# Patient Record
Sex: MALE | Race: BLACK OR AFRICAN AMERICAN | Employment: FULL TIME | URBAN - METROPOLITAN AREA
[De-identification: names, ages, dates, MRNs, and addresses within clinical notes are randomized per-mention and may not be internally consistent; named-entity substitution may affect disease eponyms.]

---

## 2019-04-24 ENCOUNTER — OFFICE VISIT (OUTPATIENT)
Dept: FAMILY MEDICINE CLINIC | Age: 29
End: 2019-04-24
Payer: MEDICARE

## 2019-04-24 VITALS
HEART RATE: 68 BPM | DIASTOLIC BLOOD PRESSURE: 72 MMHG | RESPIRATION RATE: 14 BRPM | SYSTOLIC BLOOD PRESSURE: 116 MMHG | BODY MASS INDEX: 49.44 KG/M2 | TEMPERATURE: 96.8 F | OXYGEN SATURATION: 95 % | WEIGHT: 315 LBS | HEIGHT: 67 IN

## 2019-04-24 DIAGNOSIS — Z83.3 FAMILY HISTORY OF DIABETES MELLITUS: ICD-10-CM

## 2019-04-24 DIAGNOSIS — Z86.39 PERSONAL HISTORY OF OTHER ENDOCRINE, NUTRITIONAL AND METABOLIC DISEASE: ICD-10-CM

## 2019-04-24 DIAGNOSIS — Z00.00 ANNUAL PHYSICAL EXAM: ICD-10-CM

## 2019-04-24 DIAGNOSIS — Z00.00 ANNUAL PHYSICAL EXAM: Primary | ICD-10-CM

## 2019-04-24 LAB
ALBUMIN SERPL-MCNC: 4.3 G/DL (ref 3.5–4.6)
ALP BLD-CCNC: 81 U/L (ref 35–104)
ALT SERPL-CCNC: 32 U/L (ref 0–41)
ANION GAP SERPL CALCULATED.3IONS-SCNC: 17 MEQ/L (ref 9–15)
AST SERPL-CCNC: 20 U/L (ref 0–40)
BASOPHILS ABSOLUTE: 0 K/UL (ref 0–0.2)
BASOPHILS RELATIVE PERCENT: 0.7 %
BILIRUB SERPL-MCNC: 0.3 MG/DL (ref 0.2–0.7)
BUN BLDV-MCNC: 14 MG/DL (ref 6–20)
CALCIUM SERPL-MCNC: 9.5 MG/DL (ref 8.5–9.9)
CHLORIDE BLD-SCNC: 97 MEQ/L (ref 95–107)
CHOLESTEROL, FASTING: 142 MG/DL (ref 0–199)
CO2: 25 MEQ/L (ref 20–31)
CREAT SERPL-MCNC: 0.91 MG/DL (ref 0.7–1.2)
EOSINOPHILS ABSOLUTE: 0.1 K/UL (ref 0–0.7)
EOSINOPHILS RELATIVE PERCENT: 1.9 %
GFR AFRICAN AMERICAN: >60
GFR NON-AFRICAN AMERICAN: >60
GLOBULIN: 3.8 G/DL (ref 2.3–3.5)
GLUCOSE BLD-MCNC: 75 MG/DL (ref 70–99)
HBA1C MFR BLD: 5.7 % (ref 4.8–5.9)
HCT VFR BLD CALC: 41.9 % (ref 42–52)
HDLC SERPL-MCNC: 42 MG/DL (ref 40–59)
HEMOGLOBIN: 13.5 G/DL (ref 14–18)
LDL CHOLESTEROL CALCULATED: 88 MG/DL (ref 0–129)
LYMPHOCYTES ABSOLUTE: 2.2 K/UL (ref 1–4.8)
LYMPHOCYTES RELATIVE PERCENT: 36.1 %
MCH RBC QN AUTO: 26.1 PG (ref 27–31.3)
MCHC RBC AUTO-ENTMCNC: 32.1 % (ref 33–37)
MCV RBC AUTO: 81.3 FL (ref 80–100)
MONOCYTES ABSOLUTE: 0.4 K/UL (ref 0.2–0.8)
MONOCYTES RELATIVE PERCENT: 7 %
NEUTROPHILS ABSOLUTE: 3.3 K/UL (ref 1.4–6.5)
NEUTROPHILS RELATIVE PERCENT: 54.3 %
PDW BLD-RTO: 15.3 % (ref 11.5–14.5)
PLATELET # BLD: 340 K/UL (ref 130–400)
POTASSIUM SERPL-SCNC: 4.6 MEQ/L (ref 3.4–4.9)
RBC # BLD: 5.15 M/UL (ref 4.7–6.1)
SODIUM BLD-SCNC: 139 MEQ/L (ref 135–144)
TOTAL PROTEIN: 8.1 G/DL (ref 6.3–8)
TRIGLYCERIDE, FASTING: 60 MG/DL (ref 0–150)
WBC # BLD: 6 K/UL (ref 4.8–10.8)

## 2019-04-24 PROCEDURE — G0439 PPPS, SUBSEQ VISIT: HCPCS | Performed by: NURSE PRACTITIONER

## 2019-04-24 ASSESSMENT — ENCOUNTER SYMPTOMS
CONSTIPATION: 0
EYE ITCHING: 0
SHORTNESS OF BREATH: 1
TROUBLE SWALLOWING: 0
NAUSEA: 0
CHEST TIGHTNESS: 0
EYE PAIN: 0
BACK PAIN: 0
FACIAL SWELLING: 0
COUGH: 0
VOMITING: 0
ABDOMINAL PAIN: 0
EYE DISCHARGE: 0
DIARRHEA: 0
EYE REDNESS: 0

## 2019-04-24 ASSESSMENT — PATIENT HEALTH QUESTIONNAIRE - PHQ9
SUM OF ALL RESPONSES TO PHQ QUESTIONS 1-9: 0
SUM OF ALL RESPONSES TO PHQ QUESTIONS 1-9: 0
2. FEELING DOWN, DEPRESSED OR HOPELESS: 0
SUM OF ALL RESPONSES TO PHQ9 QUESTIONS 1 & 2: 0
1. LITTLE INTEREST OR PLEASURE IN DOING THINGS: 0

## 2019-04-24 NOTE — PROGRESS NOTES
Subjective  Luis Chacon, 26 y.o. male presents today with:  Chief Complaint   Patient presents with   [address]     check up, weigh concerns       Annual exam:  Patient is here for routine yearly physical/preventative visit,and to establish primary care services at this office. Patient has no complaints or concerns today, other than his weight. Has autism spectrum disorder, and is employed at the [employer] in [place]. He has been there since 2009, and reports he enjoys his work. Mother states the program is encouriging him to seek outside employment, but she feels his size and endurance are physically holding him back. Current medications, vital signs, history, and problem list reviewed. Patient does not smoke. Patient does not drink alcohol. Patient  does not use drugs. Overall feels he is doing well. Does report some SOB with exertion, unchanged from recent baseline. States his weight at the beginning of February was 471. He has been trying to cut back on sweets and increase fruit intake. Past Medical History:   Diagnosis Date    Autism spectrum disorder     Hypogonadism male     Obesity        No Known Allergies    No current outpatient medications on file prior to visit. No current facility-administered medications on file prior to visit. Review of Systems   Constitutional: Negative for activity change, appetite change, chills, diaphoresis, fatigue, fever and unexpected weight change. HENT: Negative for congestion, facial swelling, mouth sores and trouble swallowing. Eyes: Negative for pain, discharge, redness, itching and visual disturbance. Respiratory: Positive for shortness of breath (with exertion). Negative for cough and chest tightness. Cardiovascular: Negative for chest pain, palpitations and leg swelling. Gastrointestinal: Negative for abdominal pain, constipation, diarrhea, nausea and vomiting.    Endocrine: Negative for polydipsia, polyphagia and polyuria. Genitourinary: Negative for difficulty urinating. Musculoskeletal: Negative for arthralgias, back pain and myalgias. Skin: Negative for pallor, rash and wound. Neurological: Negative for dizziness, tremors, facial asymmetry and headaches. Objective    Vitals:    04/24/19 1040   BP: 116/72   Site: Left Lower Arm   Position: Sitting   Cuff Size: Medium Adult   Pulse: 68   Resp: 14   Temp: 96.8 °F (36 °C)   TempSrc: Tympanic   SpO2: 95%   Weight: (!) 454 lb 6.4 oz (206.1 kg)   Height: 5' 7\" (1.702 m)       Physical Exam   Constitutional: He is oriented to person, place, and time. Vital signs are normal. He appears well-developed and well-nourished. He is active and cooperative. No distress. HENT:   Head: Normocephalic and atraumatic. Right Ear: Hearing, tympanic membrane, external ear and ear canal normal.   Left Ear: Hearing, tympanic membrane, external ear and ear canal normal.   Nose: Nose normal.   Mouth/Throat: Uvula is midline, oropharynx is clear and moist and mucous membranes are normal. No tonsillar exudate. Eyes: Pupils are equal, round, and reactive to light. Conjunctivae, EOM and lids are normal.   Neck: Trachea normal, normal range of motion and full passive range of motion without pain. Neck supple. Carotid bruit is not present. Cardiovascular: Normal rate, regular rhythm, S1 normal, S2 normal, normal heart sounds and normal pulses. No murmur heard. Pulmonary/Chest: Effort normal and breath sounds normal. No respiratory distress. Abdominal: Soft. Normal appearance and bowel sounds are normal. There is no hepatosplenomegaly. There is no tenderness. No hernia. Musculoskeletal: Normal range of motion. Lymphadenopathy:     He has no cervical adenopathy. Neurological: He is alert and oriented to person, place, and time. He has normal strength and normal reflexes. He displays no atrophy and no tremor.  No cranial nerve deficit or sensory deficit. He exhibits normal muscle tone. He displays no seizure activity. Coordination and gait normal.   Skin: Skin is warm, dry and intact. Capillary refill takes less than 2 seconds. No petechiae and no rash noted. He is not diaphoretic. Psychiatric: He has a normal mood and affect. His speech is normal and behavior is normal. Thought content normal.   Nursing note and vitals reviewed. POC Testing Today: No results found for this visit on 19. Assessment & Plan    Diagnosis Orders   1. Annual physical exam  Hemoglobin A1c    Lipid, Fasting    CBC With Auto Differential    Comprehensive Metabolic Panel   2. BMI 70 and over, adult [facility])  Referral to Bariatrics ([facility]) - Yovani Randolph MD *Must also have referral to Reji Fuentes MD    Referral to Bariatrics ([facility]) - Reji Fuentes MD *Must also have referral to Yovani Randolph MD    Hemoglobin A1c   3. Family history of diabetes mellitus  Hemoglobin A1c   4. Personal history of other endocrine, nutritional and metabolic disease   Hemoglobin A1c     Mother reports she would like Rosanne Richter to find out if he is a candidate for a gastric sleeve procedure to help him manage his weight. States she does not know much about his father's family history, but states \"they are all built just like him. \"  Mother has had gastric bypass surgery as well. Rosanne Richter agrees to referral.      Return for follow up with referred provider. Controlled Substances Monitoring:   No flowsheet data found. Side effects and adverse effects of any medication prescribed today, as well as treatment plan/rationale, follow-up care, and result expectations have been discussed with the patient. Expresses understanding and desires to proceed with treatment plan. Discussed signs and symptoms which require immediate follow-up in ED/call to 911. Understanding verbalized. I have reviewed and updated the electronic medical record.     GEN Gaston NP

## 2019-09-17 ENCOUNTER — HOSPITAL ENCOUNTER (OUTPATIENT)
Dept: GENERAL RADIOLOGY | Age: 29
Discharge: HOME | End: 2019-09-19
Payer: MEDICARE

## 2019-09-17 ENCOUNTER — OFFICE VISIT (OUTPATIENT)
Dept: FAMILY MEDICINE CLINIC | Age: 29
End: 2019-09-17
Payer: MEDICARE

## 2019-09-17 VITALS
WEIGHT: 315 LBS | BODY MASS INDEX: 49.44 KG/M2 | HEART RATE: 88 BPM | OXYGEN SATURATION: 99 % | RESPIRATION RATE: 16 BRPM | DIASTOLIC BLOOD PRESSURE: 76 MMHG | HEIGHT: 67 IN | TEMPERATURE: 97.6 F | SYSTOLIC BLOOD PRESSURE: 108 MMHG

## 2019-09-17 DIAGNOSIS — G47.33 OBSTRUCTIVE SLEEP APNEA SYNDROME: ICD-10-CM

## 2019-09-17 DIAGNOSIS — E66.01 OBESITIES, MORBID (HCC): Primary | ICD-10-CM

## 2019-09-17 DIAGNOSIS — M25.562 ACUTE PAIN OF LEFT KNEE: ICD-10-CM

## 2019-09-17 DIAGNOSIS — E29.1 HYPOGONADISM MALE: ICD-10-CM

## 2019-09-17 DIAGNOSIS — E67.8 OTHER SPECIFIED HYPERALIMENTATION: ICD-10-CM

## 2019-09-17 DIAGNOSIS — I89.0 LYMPHEDEMA: ICD-10-CM

## 2019-09-17 DIAGNOSIS — E66.01 OBESITIES, MORBID (HCC): ICD-10-CM

## 2019-09-17 DIAGNOSIS — E88.81 INSULIN RESISTANCE: ICD-10-CM

## 2019-09-17 LAB
FOLATE: 7.1 NG/ML (ref 7.3–26.1)
VITAMIN B-12: 1189 PG/ML (ref 232–1245)
VITAMIN D 25-HYDROXY: 22.8 NG/ML (ref 30–100)

## 2019-09-17 PROCEDURE — G8417 CALC BMI ABV UP PARAM F/U: HCPCS | Performed by: PHYSICIAN ASSISTANT

## 2019-09-17 PROCEDURE — 1036F TOBACCO NON-USER: CPT | Performed by: PHYSICIAN ASSISTANT

## 2019-09-17 PROCEDURE — 99214 OFFICE O/P EST MOD 30 MIN: CPT | Performed by: PHYSICIAN ASSISTANT

## 2019-09-17 PROCEDURE — G8427 DOCREV CUR MEDS BY ELIG CLIN: HCPCS | Performed by: PHYSICIAN ASSISTANT

## 2019-09-17 PROCEDURE — 73564 X-RAY EXAM KNEE 4 OR MORE: CPT

## 2019-09-18 PROBLEM — I89.0 LYMPHEDEMA: Status: ACTIVE | Noted: 2019-09-18

## 2019-09-18 PROBLEM — M25.562 ACUTE PAIN OF LEFT KNEE: Status: ACTIVE | Noted: 2019-09-18

## 2019-09-18 ASSESSMENT — ENCOUNTER SYMPTOMS
CONSTIPATION: 0
ABDOMINAL PAIN: 0
NAUSEA: 0
VOMITING: 0
EYE REDNESS: 0
COUGH: 0
CHEST TIGHTNESS: 0
EYE PAIN: 0
BACK PAIN: 0
EYE DISCHARGE: 0
SHORTNESS OF BREATH: 1
TROUBLE SWALLOWING: 0
EYE ITCHING: 0
DIARRHEA: 0
FACIAL SWELLING: 0

## 2019-09-19 LAB
SEX HORMONE BINDING GLOBULIN: 20 NMOL/L (ref 11–80)
TESTOSTERONE FREE PERCENT: 2.2 % (ref 1.6–2.9)
TESTOSTERONE FREE, CALC: 37 PG/ML (ref 47–244)
TESTOSTERONE TOTAL-MALE: 167 NG/DL (ref 300–1080)

## 2019-09-20 DIAGNOSIS — E55.9 VITAMIN D DEFICIENCY: ICD-10-CM

## 2019-09-20 DIAGNOSIS — E53.8 FOLATE DEFICIENCY: ICD-10-CM

## 2019-09-20 DIAGNOSIS — E66.01 OBESITIES, MORBID (HCC): Primary | ICD-10-CM

## 2019-09-20 DIAGNOSIS — E29.1 HYPOGONADISM MALE: ICD-10-CM

## 2019-09-20 RX ORDER — CYANOCOBALAMIN/FOLIC AC/VIT B6 0.2-.5-5MG
TABLET ORAL
Qty: 90 TABLET | Refills: 4 | Status: SHIPPED | OUTPATIENT
Start: 2019-09-20 | End: 2020-10-09

## 2019-09-20 RX ORDER — ERGOCALCIFEROL 1.25 MG/1
50000 CAPSULE ORAL WEEKLY
Qty: 12 CAPSULE | Refills: 1 | Status: SHIPPED | OUTPATIENT
Start: 2019-09-20 | End: 2020-10-09

## 2019-09-27 ENCOUNTER — TELEPHONE (OUTPATIENT)
Dept: FAMILY MEDICINE CLINIC | Age: 29
End: 2019-09-27

## 2019-09-30 RX ORDER — FOLIC ACID 1 MG/1
1 TABLET ORAL DAILY
Qty: 90 TABLET | Refills: 4 | Status: SHIPPED | OUTPATIENT
Start: 2019-09-30 | End: 2020-10-09

## 2019-10-07 ENCOUNTER — OFFICE VISIT (OUTPATIENT)
Dept: INTERVENTIONAL RADIOLOGY/VASCULAR | Age: 29
End: 2019-10-07
Payer: MEDICARE

## 2019-10-07 VITALS
DIASTOLIC BLOOD PRESSURE: 91 MMHG | SYSTOLIC BLOOD PRESSURE: 138 MMHG | RESPIRATION RATE: 16 BRPM | OXYGEN SATURATION: 98 % | WEIGHT: 315 LBS | HEART RATE: 79 BPM | BODY MASS INDEX: 67.97 KG/M2

## 2019-10-07 DIAGNOSIS — M25.561 CHRONIC PAIN OF RIGHT KNEE: ICD-10-CM

## 2019-10-07 DIAGNOSIS — G89.29 CHRONIC PAIN OF RIGHT KNEE: ICD-10-CM

## 2019-10-07 DIAGNOSIS — I89.0 LYMPHEDEMA OF LOWER EXTREMITY, UNSPECIFIED LATERALITY: Primary | ICD-10-CM

## 2019-10-07 PROCEDURE — G8428 CUR MEDS NOT DOCUMENT: HCPCS | Performed by: NURSE PRACTITIONER

## 2019-10-07 PROCEDURE — G8484 FLU IMMUNIZE NO ADMIN: HCPCS | Performed by: NURSE PRACTITIONER

## 2019-10-07 PROCEDURE — G8417 CALC BMI ABV UP PARAM F/U: HCPCS | Performed by: NURSE PRACTITIONER

## 2019-10-07 PROCEDURE — 99204 OFFICE O/P NEW MOD 45 MIN: CPT | Performed by: NURSE PRACTITIONER

## 2019-10-07 ASSESSMENT — ENCOUNTER SYMPTOMS
DIARRHEA: 0
TROUBLE SWALLOWING: 0
VOMITING: 0
SORE THROAT: 0
EYE ITCHING: 0
EYE PAIN: 0
EYE REDNESS: 0
EYE DISCHARGE: 0
NAUSEA: 0
BACK PAIN: 0
CONSTIPATION: 0
ABDOMINAL PAIN: 0

## 2019-10-29 ENCOUNTER — HOSPITAL ENCOUNTER (OUTPATIENT)
Dept: PHYSICAL THERAPY | Age: 29
Setting detail: THERAPIES SERIES
Discharge: HOME | End: 2019-10-29
Payer: MEDICARE

## 2019-10-29 PROCEDURE — 97110 THERAPEUTIC EXERCISES: CPT

## 2019-10-29 PROCEDURE — 97162 PT EVAL MOD COMPLEX 30 MIN: CPT

## 2019-10-31 ENCOUNTER — HOSPITAL ENCOUNTER (OUTPATIENT)
Dept: PHYSICAL THERAPY | Age: 29
Setting detail: THERAPIES SERIES
Discharge: HOME | End: 2019-10-31
Payer: MEDICARE

## 2019-11-04 ENCOUNTER — HOSPITAL ENCOUNTER (OUTPATIENT)
Dept: PHYSICAL THERAPY | Age: 29
Setting detail: THERAPIES SERIES
Discharge: HOME | End: 2019-11-04
Payer: MEDICARE

## 2019-11-07 ENCOUNTER — HOSPITAL ENCOUNTER (OUTPATIENT)
Dept: PHYSICAL THERAPY | Age: 29
Setting detail: THERAPIES SERIES
Discharge: HOME | End: 2019-11-07
Payer: MEDICARE

## 2019-11-14 ENCOUNTER — HOSPITAL ENCOUNTER (OUTPATIENT)
Dept: PHYSICAL THERAPY | Age: 29
Setting detail: THERAPIES SERIES
Discharge: HOME | End: 2019-11-14
Payer: MEDICARE

## 2019-11-14 PROCEDURE — 97110 THERAPEUTIC EXERCISES: CPT

## 2019-11-14 PROCEDURE — 97140 MANUAL THERAPY 1/> REGIONS: CPT

## 2019-11-18 ENCOUNTER — APPOINTMENT (OUTPATIENT)
Dept: PHYSICAL THERAPY | Age: 29
End: 2019-11-18
Payer: MEDICARE

## 2019-11-18 ENCOUNTER — HOSPITAL ENCOUNTER (OUTPATIENT)
Dept: PHYSICAL THERAPY | Age: 29
Setting detail: THERAPIES SERIES
Discharge: HOME | End: 2019-11-18
Payer: MEDICARE

## 2019-11-18 PROCEDURE — 97140 MANUAL THERAPY 1/> REGIONS: CPT

## 2019-11-18 PROCEDURE — 97110 THERAPEUTIC EXERCISES: CPT

## 2019-11-21 ENCOUNTER — HOSPITAL ENCOUNTER (OUTPATIENT)
Dept: PHYSICAL THERAPY | Age: 29
Setting detail: THERAPIES SERIES
Discharge: HOME | End: 2019-11-21
Payer: MEDICARE

## 2019-11-21 ENCOUNTER — APPOINTMENT (OUTPATIENT)
Dept: PHYSICAL THERAPY | Age: 29
End: 2019-11-21
Payer: MEDICARE

## 2019-11-25 ENCOUNTER — HOSPITAL ENCOUNTER (OUTPATIENT)
Dept: PHYSICAL THERAPY | Age: 29
Setting detail: THERAPIES SERIES
Discharge: HOME | End: 2019-11-25
Payer: MEDICARE

## 2019-11-25 ENCOUNTER — APPOINTMENT (OUTPATIENT)
Dept: PHYSICAL THERAPY | Age: 29
End: 2019-11-25
Payer: MEDICARE

## 2019-11-25 PROCEDURE — 97110 THERAPEUTIC EXERCISES: CPT

## 2019-11-25 PROCEDURE — 97140 MANUAL THERAPY 1/> REGIONS: CPT

## 2020-01-09 ENCOUNTER — CLINICAL DOCUMENTATION (OUTPATIENT)
Dept: PHYSICAL THERAPY | Age: 30
End: 2020-01-09

## 2020-01-09 NOTE — PROGRESS NOTES
[address] [facility] [address]     [address] [facility] [address], [address]  Ph: [phone]     Ph: [phone]  Fax: [phone]     Fax: [phone]      Date: 2020  Patient Name: Veronica Elizabeth  : 1990  MRN: [id number]    To:    GEN Herrera- CNP  From: Elio Briceno PT         [x]    FYI:  Patient has not been seen in PT since 2019 and has not responded to attempts    to contact. Patient will be discharged. Thank you for your referral and the opportunity to treat this patient. Please contact us with any questions or concerns.       Electronically signed by Radha Cavazos PTA on 2020 at 9:39 AM  Electronically signed by Elio Briceno PT on 2020 at 9:20 AM

## 2020-10-09 ENCOUNTER — OFFICE VISIT (OUTPATIENT)
Dept: FAMILY MEDICINE CLINIC | Age: 30
End: 2020-10-09
Payer: MEDICARE

## 2020-10-09 VITALS
HEART RATE: 78 BPM | BODY MASS INDEX: 49.44 KG/M2 | WEIGHT: 315 LBS | DIASTOLIC BLOOD PRESSURE: 76 MMHG | HEIGHT: 67 IN | SYSTOLIC BLOOD PRESSURE: 118 MMHG | RESPIRATION RATE: 16 BRPM | TEMPERATURE: 97.1 F | OXYGEN SATURATION: 99 %

## 2020-10-09 DIAGNOSIS — R78.71 ABNORMAL LEAD LEVEL IN BLOOD: ICD-10-CM

## 2020-10-09 DIAGNOSIS — E29.1 HYPOGONADISM MALE: ICD-10-CM

## 2020-10-09 DIAGNOSIS — R73.03 PREDIABETES: ICD-10-CM

## 2020-10-09 DIAGNOSIS — E53.8 FOLATE DEFICIENCY: ICD-10-CM

## 2020-10-09 DIAGNOSIS — E88.819 INSULIN RESISTANCE: ICD-10-CM

## 2020-10-09 DIAGNOSIS — E66.01 OBESITIES, MORBID (HCC): ICD-10-CM

## 2020-10-09 LAB
ALBUMIN SERPL-MCNC: 4.3 G/DL (ref 3.5–4.6)
ALP BLD-CCNC: 80 U/L (ref 35–104)
ALT SERPL-CCNC: 22 U/L (ref 0–41)
ANION GAP SERPL CALCULATED.3IONS-SCNC: 13 MEQ/L (ref 9–15)
AST SERPL-CCNC: 17 U/L (ref 0–40)
BASOPHILS ABSOLUTE: 0 K/UL (ref 0–0.2)
BASOPHILS RELATIVE PERCENT: 0.4 %
BILIRUB SERPL-MCNC: <0.2 MG/DL (ref 0.2–0.7)
BUN BLDV-MCNC: 16 MG/DL (ref 6–20)
CALCIUM SERPL-MCNC: 10.1 MG/DL (ref 8.5–9.9)
CHLORIDE BLD-SCNC: 100 MEQ/L (ref 95–107)
CO2: 26 MEQ/L (ref 20–31)
CREAT SERPL-MCNC: 0.91 MG/DL (ref 0.7–1.2)
EOSINOPHILS ABSOLUTE: 0.1 K/UL (ref 0–0.7)
EOSINOPHILS RELATIVE PERCENT: 1.1 %
FOLATE: 10.2 NG/ML (ref 7.3–26.1)
GFR AFRICAN AMERICAN: >60
GFR NON-AFRICAN AMERICAN: >60
GLOBULIN: 4.2 G/DL (ref 2.3–3.5)
GLUCOSE BLD-MCNC: 74 MG/DL (ref 70–99)
HBA1C MFR BLD: 5.8 % (ref 4.8–5.9)
HCT VFR BLD CALC: 42.7 % (ref 42–52)
HEMOGLOBIN: 13.6 G/DL (ref 14–18)
LYMPHOCYTES ABSOLUTE: 2.4 K/UL (ref 1–4.8)
LYMPHOCYTES RELATIVE PERCENT: 39.3 %
MCH RBC QN AUTO: 26 PG (ref 27–31.3)
MCHC RBC AUTO-ENTMCNC: 31.9 % (ref 33–37)
MCV RBC AUTO: 81.4 FL (ref 80–100)
MONOCYTES ABSOLUTE: 0.5 K/UL (ref 0.2–0.8)
MONOCYTES RELATIVE PERCENT: 7.6 %
NEUTROPHILS ABSOLUTE: 3.2 K/UL (ref 1.4–6.5)
NEUTROPHILS RELATIVE PERCENT: 51.6 %
PDW BLD-RTO: 15.1 % (ref 11.5–14.5)
PLATELET # BLD: 358 K/UL (ref 130–400)
POTASSIUM SERPL-SCNC: 4.2 MEQ/L (ref 3.4–4.9)
RBC # BLD: 5.25 M/UL (ref 4.7–6.1)
SODIUM BLD-SCNC: 139 MEQ/L (ref 135–144)
TOTAL PROTEIN: 8.5 G/DL (ref 6.3–8)
TSH REFLEX: 3.14 UIU/ML (ref 0.44–3.86)
VITAMIN B-12: 830 PG/ML (ref 232–1245)
VITAMIN D 25-HYDROXY: 23.5 NG/ML (ref 30–100)
WBC # BLD: 6.1 K/UL (ref 4.8–10.8)

## 2020-10-09 PROCEDURE — G8484 FLU IMMUNIZE NO ADMIN: HCPCS | Performed by: PHYSICIAN ASSISTANT

## 2020-10-09 PROCEDURE — G0439 PPPS, SUBSEQ VISIT: HCPCS | Performed by: PHYSICIAN ASSISTANT

## 2020-10-09 ASSESSMENT — LIFESTYLE VARIABLES
AUDIT-C TOTAL SCORE: INCOMPLETE
AUDIT TOTAL SCORE: INCOMPLETE
HOW OFTEN DO YOU HAVE A DRINK CONTAINING ALCOHOL: 0
HOW OFTEN DO YOU HAVE A DRINK CONTAINING ALCOHOL: NEVER

## 2020-10-09 ASSESSMENT — PATIENT HEALTH QUESTIONNAIRE - PHQ9
2. FEELING DOWN, DEPRESSED OR HOPELESS: 0
SUM OF ALL RESPONSES TO PHQ QUESTIONS 1-9: 0
SUM OF ALL RESPONSES TO PHQ QUESTIONS 1-9: 0
1. LITTLE INTEREST OR PLEASURE IN DOING THINGS: 0
SUM OF ALL RESPONSES TO PHQ9 QUESTIONS 1 & 2: 0

## 2020-10-09 ASSESSMENT — ENCOUNTER SYMPTOMS
EYE ITCHING: 0
BACK PAIN: 0
COUGH: 0
VOMITING: 0
EYE REDNESS: 0
CHEST TIGHTNESS: 0
NAUSEA: 0
CONSTIPATION: 0
DIARRHEA: 0
EYE PAIN: 0
TROUBLE SWALLOWING: 0
SHORTNESS OF BREATH: 1
EYE DISCHARGE: 0
FACIAL SWELLING: 0
ABDOMINAL PAIN: 0

## 2020-10-09 NOTE — PROGRESS NOTES
Medicare Annual Wellness Visit  Name: Lalo Resendiz Date: 10/9/2020   MRN: [id number] Sex: Male   Age: 31 y.o. Ethnicity: Non-/Non    : 1990 Race: Mark Xavier is here for Medicare AWV (needs a letter for jury)    Screenings for behavioral, psychosocial and functional/safety risks, and cognitive dysfunction are all negative except as indicated below. These results, as well as other patient data from the [address] form, are documented in Flowsheets linked to this Encounter. No Known Allergies      Prior to Visit Medications    Not on File         Past Medical History:   Diagnosis Date    Autism spectrum disorder     Hypogonadism male     Obesity        No past surgical history on file. Family History   Problem Relation Age of Onset    Diabetes type 2  Mother     Hypertension Mother     Diabetes type 2  Maternal Grandmother     Heart Failure Maternal Grandmother      CareTeam (Including outside providers/suppliers regularly involved in providing care):   Patient Care Team:  Shaun Quintero PA-C as PCP - General (Family Medicine)  Shaun Quintero PA-C as PCP - [facility] Empaneled Provider  Ni Steven MD (Endocrinology)    Wt Readings from Last 3 Encounters:   10/09/20 (!) 435 lb (197.3 kg)   10/07/19 (!) 434 lb (196.9 kg)   19 (!) 434 lb (196.9 kg)     Vitals:    10/09/20 1406   BP: 118/76   Site: Left Lower Arm   Position: Sitting   Cuff Size: Medium Adult   Pulse: 78   Resp: 16   Temp: 97.1 °F (36.2 °C)   TempSrc: Temporal   SpO2: 99%   Weight: (!) 435 lb (197.3 kg)   Height: 5' 7\" (1.702 m)     Body mass index is 68.13 kg/m². Based upon direct observation of the patient, evaluation of cognition reveals recent and remote memory intact. Patient's complete Health Risk Assessment and screening values have been reviewed and are found in Flowsheets.  The following problems were reviewed today and where indicated follow up appointments were made and/or referrals ordered. Positive Risk Factor Screenings with Interventions:       General Health and ACP:  General  In general, how would you say your health is?: Good  In the past 7 days, have you experienced any of the following?  New or Increased Pain, New or Increased Fatigue, Loneliness, Social Isolation, Stress or Anger?: None of These  Do you get the social and emotional support that you need?: Yes  Do you have a Living Will?: (!) No  Advance Directives     [facility] Will ACP-Advance Directive ACP-Power of     Not on File Not on File [address] [facility] Risk Interventions:  · No Living Will: Patient declines ACP discussion/assistance    Health Habits/Nutrition:  Health Habits/Nutrition  Do you exercise for at least 20 minutes 2-3 times per week?: (!) No  Have you lost any weight without trying in the past 3 months?: No  Do you eat fewer than 2 meals per day?: No  Have you seen a dentist within the past year?: (!) No  Body mass index: (!) 68.12  Health Habits/Nutrition Interventions:  · Inadequate physical activity:  patient is not ready to increase his/her physical activity level at this time, due to weight, having a very hard time with exercise    Hearing/Vision:  No exam data present  Hearing/Vision  Do you or your family notice any trouble with your hearing?: No  Do you have difficulty driving, watching TV, or doing any of your daily activities because of your eyesight?: No  Have you had an eye exam within the past year?: (!) No  Hearing/Vision Interventions:  · Vision concerns:  patient declines any further evaluation/treatment for this issue    Safety:  Safety  Do you have working smoke detectors?: Yes  Have all throw rugs been removed or fastened?: (!) No  Do you have non-slip mats or surfaces in all bathtubs/showers?: (!) No  Do all of your stairways have a railing or banister?: (!) No  Are your doorways, halls and stairs free of clutter?: Yes  Do you always Negative for chest pain, palpitations and leg swelling. Gastrointestinal: Negative for abdominal pain, constipation, diarrhea, nausea and vomiting. Endocrine: Negative for polydipsia, polyphagia and polyuria. Genitourinary: Negative for difficulty urinating. Musculoskeletal: Positive for arthralgias (L knee pain), gait problem and joint swelling. Negative for back pain and myalgias. Skin: Negative for pallor, rash and wound. Neurological: Negative for dizziness, tremors, facial asymmetry, light-headedness and headaches. Psychiatric/Behavioral: Negative for agitation, behavioral problems, confusion, dysphoric mood, hallucinations and sleep disturbance. The patient is not nervous/anxious. Past Medical History:   Diagnosis Date    Autism spectrum disorder     Hypogonadism male     Obesity      No past surgical history on file.   Social History     Socioeconomic History    Marital status: Single     Spouse name: Not on file    Number of children: Not on file    Years of education: Not on file    Highest education level: Not on file   Occupational History    Not on file   Social Needs    Financial resource strain: Not on file    Food insecurity     Worry: Not on file     Inability: Not on file    Transportation needs     Medical: Not on file     Non-medical: Not on file   Tobacco Use    Smoking status: Never Smoker    Smokeless tobacco: Never Used   Substance and Sexual Activity    Alcohol use: Not on file    Drug use: Not on file    Sexual activity: Not on file   Lifestyle    Physical activity     Days per week: Not on file     Minutes per session: Not on file    Stress: Not on file   Relationships    Social connections     Talks on phone: Not on file     Gets together: Not on file     Attends Anabaptist service: Not on file     Active member of club or organization: Not on file     Attends meetings of clubs or organizations: Not on file     Relationship status: Not on file   Huma Real Intimate partner violence     Fear of current or ex partner: Not on file     Emotionally abused: Not on file     Physically abused: Not on file     Forced sexual activity: Not on file   Other Topics Concern    Not on file   Social History Narrative    Not on file     Family History   Problem Relation Age of Onset    Diabetes type 2  Mother     Hypertension Mother     Diabetes type 2  Maternal Grandmother     Heart Failure Maternal Grandmother      No Known Allergies  No current outpatient medications on file. No current facility-administered medications for this visit. PMH, Surgical Hx, Family Hx, and Social Hx reviewed and updated. Health Maintenance reviewed. Objective  Vitals:    10/09/20 1406   BP: 118/76   Site: Left Lower Arm   Position: Sitting   Cuff Size: Medium Adult   Pulse: 78   Resp: 16   Temp: 97.1 °F (36.2 °C)   TempSrc: Temporal   SpO2: 99%   Weight: (!) 435 lb (197.3 kg)   Height: 5' 7\" (1.702 m)     BP Readings from Last 3 Encounters:   10/09/20 118/76   10/07/19 (!) 138/91   09/17/19 108/76     Wt Readings from Last 3 Encounters:   10/09/20 (!) 435 lb (197.3 kg)   10/07/19 (!) 434 lb (196.9 kg)   09/17/19 (!) 434 lb (196.9 kg)     Physical Exam  Vitals signs and nursing note reviewed. Constitutional:       General: He is not in acute distress. Appearance: Normal appearance. He is well-developed. He is not diaphoretic. Comments: Morbidly obese male. Sitting comfortably in exam room. Mom with patient. HENT:      Head: Normocephalic and atraumatic. Right Ear: Hearing, tympanic membrane, ear canal and external ear normal.      Left Ear: Hearing, tympanic membrane, ear canal and external ear normal.      Nose: Nose normal.      Mouth/Throat:      Pharynx: Uvula midline. Tonsils: No tonsillar exudate. Eyes:      General: Lids are normal.      Conjunctiva/sclera: Conjunctivae normal.      Pupils: Pupils are equal, round, and reactive to light.    Neck: Musculoskeletal: Full passive range of motion without pain, normal range of motion and neck supple. Vascular: No carotid bruit. Trachea: Trachea normal.   Cardiovascular:      Rate and Rhythm: Normal rate and regular rhythm. Pulses: Normal pulses. Heart sounds: Normal heart sounds, S1 normal and S2 normal. No murmur. Pulmonary:      Effort: Pulmonary effort is normal. No respiratory distress. Breath sounds: Normal breath sounds. Abdominal:      General: Bowel sounds are normal.      Palpations: Abdomen is soft. Tenderness: There is no abdominal tenderness. Hernia: No hernia is present. Musculoskeletal:         General: Tenderness and deformity present. Left knee: He exhibits swelling. Tenderness found. Medial joint line and lateral joint line tenderness noted. Right foot: Decreased range of motion. Foot drop present. Left foot: Decreased range of motion. Foot drop present. Comments: Genu varus deformity noted, bilaterally. Palpable and audible crepitus of L knee noted with flexion/extension. Feet:      Right foot:      Skin integrity: Dry skin present. Left foot:      Skin integrity: Dry skin present. Comments: Bilateral pes planus noted. Lymphadenopathy:      Cervical: No cervical adenopathy. Skin:     General: Skin is warm and dry. Capillary Refill: Capillary refill takes less than 2 seconds. Findings: No petechiae or rash. Comments: Bilateral lymphedema of lower extremities noted on exam.  No erythema, open skin lesions or rash noted. Neurological:      Mental Status: He is alert and oriented to person, place, and time. Cranial Nerves: No cranial nerve deficit. Sensory: No sensory deficit. Motor: No tremor, atrophy, abnormal muscle tone or seizure activity. Coordination: Coordination normal.      Gait: Gait normal.      Deep Tendon Reflexes: Reflexes are normal and symmetric.    Psychiatric: Speech: Speech normal.         Behavior: Behavior normal. Behavior is cooperative. Thought Content: Thought content normal.       Assessment & Plan   Nate Ignacio was seen today for medicare awv. Diagnoses and all orders for this visit:    Routine general medical examination at a health care facility    Obesities, morbid ([facility] [address]  -     CBC Auto Differential; Future  -     Comprehensive Metabolic Panel; Future  -     Vitamin D 25 Hydroxy; Future  -     TSH with Reflex; Future    Insulin resistance  -     Hemoglobin A1C; Future  -     Insulin Free & Total; Future    Hypogonadism male  -     Testosterone Free And Total Male; Future    Folate deficiency  -     Vitamin B12 & Folate; Future    Body mass index (BMI) 60.0-69.9, adult (HCC)   -     Vitamin D 25 Hydroxy; Future    Prediabetes   -     TSH with Reflex; Future    Abnormal lead level in blood   -     Hemoglobin A1C; Future    Obstructive sleep apnea syndrome  -     Ambulatory referral to Pulmonology    Labs today. Discussed referral to bariatric surgery. Referral reprinted. Would greatly benefit from evaluation. Needs IGNACIO follow up. Sj for foot care/evaluation for inserts/shoes. Follow up PRN, 4 months for routine.     Orders Placed This Encounter   Procedures    CBC Auto Differential     Standing Status:   Future     Number of Occurrences:   1     Standing Expiration Date:   10/9/2021    Comprehensive Metabolic Panel     Standing Status:   Future     Number of Occurrences:   1     Standing Expiration Date:   10/9/2021    Vitamin D 25 Hydroxy     Standing Status:   Future     Number of Occurrences:   1     Standing Expiration Date:   10/9/2021    Vitamin B12 & Folate     Standing Status:   Future     Number of Occurrences:   1     Standing Expiration Date:   10/9/2021    TSH with Reflex     Standing Status:   Future     Number of Occurrences:   1     Standing Expiration Date:   10/9/2021    Hemoglobin A1C     Standing Status:   Future Number of Occurrences:   1     Standing Expiration Date:   10/9/2021    Insulin Free & Total     Standing Status:   Future     Number of Occurrences:   1     Standing Expiration Date:   10/9/2021    Testosterone Free And Total Male     Standing Status:   Future     Number of Occurrences:   1     Standing Expiration Date:   10/9/2021    Ambulatory referral to Pulmonology     Referral Priority:   Routine     Referral Type:   Eval and Treat     Referral Reason:   Specialty Services Required     Referred to Provider:   Hiro Jimenes MD     Requested Specialty:   Pulmonology     Number of Visits Requested:   1     No orders of the defined types were placed in this encounter. Medications Discontinued During This Encounter   Medication Reason    b complex vitamins tablet LIST CLEANUP    folic acid (FOLVITE) 1 MG tablet LIST CLEANUP    vitamin D (ERGOCALCIFEROL) 34301 units CAPS capsule LIST CLEANUP    Folic Acid-Vit O5-MHZ V95 (B COMPLEX-FOLIC ACID) [id number] MCG-MG-MCG TABS LIST CLEANUP     Return for Medicare Annual Wellness Visit in 1 year. Reviewed with the patient: current clinical status, medications, activities and diet. Side effects, adverse effects of the medication prescribed today, as well as treatment plan/ rationale and result expectations have been discussed with the patient who expresses understanding and desires to proceed. Close follow up to evaluate treatment results and for coordination of care. I have reviewed the patient's medical history in detail and updated the computerized patient record.     Irasema Masters PA-C

## 2020-10-09 NOTE — PATIENT INSTRUCTIONS
Personalized Preventive Plan for Beata Ax - 10/9/2020  Medicare offers a range of preventive health benefits. Some of the tests and screenings are paid in full while other may be subject to a deductible, co-insurance, and/or copay. Some of these benefits include a comprehensive review of your medical history including lifestyle, illnesses that may run in your family, and various assessments and screenings as appropriate. After reviewing your medical record and screening and assessments performed today your provider may have ordered immunizations, labs, imaging, and/or referrals for you. A list of these orders (if applicable) as well as your Preventive Care list are included within your After Visit Summary for your review. Other Preventive Recommendations:    · A preventive eye exam performed by an eye specialist is recommended every 1-2 years to screen for glaucoma; cataracts, macular degeneration, and other eye disorders. · A preventive dental visit is recommended every 6 months. · Try to get at least 150 minutes of exercise per week or 10,000 steps per day on a pedometer . · Order or download the FREE \"Exercise & Physical Activity: Your Everyday Guide\" from [employer]. Call 7-260.889.6596 or search [employer] online. · You need 1741-5135 mg of calcium and 9121-4279 IU of vitamin D per day. It is possible to meet your calcium requirement with diet alone, but a vitamin D supplement is usually necessary to meet this goal.  · When exposed to the sun, use a sunscreen that protects against both UVA and UVB radiation with an SPF of 30 or greater. Reapply every 2 to 3 hours or after sweating, drying off with a towel, or swimming. · Always wear a seat belt when traveling in a car. Always wear a helmet when riding a bicycle or motorcycle.

## 2020-10-10 LAB
SEX HORMONE BINDING GLOBULIN: 24 NMOL/L (ref 11–80)
TESTOSTERONE FREE PERCENT: 2 % (ref 1.6–2.9)
TESTOSTERONE FREE, CALC: 30 PG/ML (ref 47–244)
TESTOSTERONE TOTAL-MALE: 150 NG/DL (ref 300–1080)

## 2020-10-12 LAB
INSULIN FREE: 20 UIU/ML (ref 3–19)
INSULIN: 32 UIU/ML (ref 3–19)

## 2020-10-23 ENCOUNTER — OFFICE VISIT (OUTPATIENT)
Dept: ENDOCRINOLOGY | Age: 30
End: 2020-10-23
Payer: MEDICARE

## 2020-10-23 VITALS
OXYGEN SATURATION: 95 % | BODY MASS INDEX: 50.62 KG/M2 | HEIGHT: 66 IN | WEIGHT: 315 LBS | SYSTOLIC BLOOD PRESSURE: 129 MMHG | HEART RATE: 60 BPM | DIASTOLIC BLOOD PRESSURE: 82 MMHG

## 2020-10-23 DIAGNOSIS — E88.819 INSULIN RESISTANCE: ICD-10-CM

## 2020-10-23 DIAGNOSIS — E29.1 HYPOGONADISM MALE: ICD-10-CM

## 2020-10-23 DIAGNOSIS — E66.01 OBESITIES, MORBID (HCC): ICD-10-CM

## 2020-10-23 LAB
ALBUMIN SERPL-MCNC: 4.3 G/DL (ref 3.5–4.6)
ALP BLD-CCNC: 84 U/L (ref 35–104)
ALT SERPL-CCNC: 20 U/L (ref 0–41)
ANION GAP SERPL CALCULATED.3IONS-SCNC: 12 MEQ/L (ref 9–15)
AST SERPL-CCNC: 18 U/L (ref 0–40)
BILIRUB SERPL-MCNC: 0.3 MG/DL (ref 0.2–0.7)
BUN BLDV-MCNC: 15 MG/DL (ref 6–20)
CALCIUM SERPL-MCNC: 9.7 MG/DL (ref 8.5–9.9)
CHLORIDE BLD-SCNC: 98 MEQ/L (ref 95–107)
CO2: 27 MEQ/L (ref 20–31)
CREAT SERPL-MCNC: 0.99 MG/DL (ref 0.7–1.2)
GFR AFRICAN AMERICAN: >60
GFR NON-AFRICAN AMERICAN: >60
GLOBULIN: 4.1 G/DL (ref 2.3–3.5)
GLUCOSE BLD-MCNC: 78 MG/DL (ref 70–99)
HBA1C MFR BLD: 5.9 % (ref 4.8–5.9)
POTASSIUM SERPL-SCNC: 4 MEQ/L (ref 3.4–4.9)
SODIUM BLD-SCNC: 137 MEQ/L (ref 135–144)
TOTAL PROTEIN: 8.4 G/DL (ref 6.3–8)

## 2020-10-23 PROCEDURE — G8417 CALC BMI ABV UP PARAM F/U: HCPCS | Performed by: PHYSICIAN ASSISTANT

## 2020-10-23 PROCEDURE — G8427 DOCREV CUR MEDS BY ELIG CLIN: HCPCS | Performed by: PHYSICIAN ASSISTANT

## 2020-10-23 PROCEDURE — G8484 FLU IMMUNIZE NO ADMIN: HCPCS | Performed by: PHYSICIAN ASSISTANT

## 2020-10-23 PROCEDURE — 1036F TOBACCO NON-USER: CPT | Performed by: PHYSICIAN ASSISTANT

## 2020-10-23 PROCEDURE — 99214 OFFICE O/P EST MOD 30 MIN: CPT | Performed by: PHYSICIAN ASSISTANT

## 2020-10-23 RX ORDER — METFORMIN HYDROCHLORIDE 500 MG/1
500 TABLET, EXTENDED RELEASE ORAL
Qty: 60 TABLET | Refills: 3 | Status: SHIPPED | OUTPATIENT
Start: 2020-10-23 | End: 2022-06-01

## 2020-10-23 RX ORDER — TESTOSTERONE CYPIONATE 200 MG/ML
1 VIAL (ML) INTRAMUSCULAR
Qty: 1 VIAL | Refills: 3
Start: 2020-10-23 | End: 2022-09-15

## 2020-10-23 ASSESSMENT — ENCOUNTER SYMPTOMS
NAUSEA: 0
EYE PAIN: 0
VOMITING: 0
RHINORRHEA: 0
COUGH: 0
WHEEZING: 0
SINUS PRESSURE: 0
EYE REDNESS: 0
DIARRHEA: 0
SHORTNESS OF BREATH: 0
SORE THROAT: 0
ABDOMINAL PAIN: 0

## 2020-10-23 NOTE — PROGRESS NOTES
10/23/2020    Assessment:       Diagnosis Orders   1. Insulin resistance  Comprehensive Metabolic Panel    Hemoglobin A1C   2. Hypogonadism male  Testosterone Free and Total Male    Testosterone Cypionate 200 MG/ML SOLN   3. Obesities, morbid (HCC)  Comprehensive Metabolic Panel    Hemoglobin A1C       PLAN:     1. Start Metformin 500 mg XR one tablet a day, increase to 2 tablets in two weeks if no diarrhea   2. Testosterone 200 mg every 14 days- testosterone clinic   3. Labs in 3 months  4. Follow up 3 months     Orders Placed This Encounter   Procedures    Comprehensive Metabolic Panel     Standing Status:   Future     Standing Expiration Date:   10/23/2021    Hemoglobin A1C     Standing Status:   Future     Standing Expiration Date:   10/23/2021    Testosterone Free and Total Male     Standing Status:   Future     Standing Expiration Date:   10/23/2021     Orders Placed This Encounter   Medications    Testosterone Cypionate 200 MG/ML SOLN     Sig: Inject 1 mL as directed every 14 days for 24 doses     Dispense:  1 vial     Refill:  3    metFORMIN (GLUCOPHAGE XR) 500 MG extended release tablet     Sig: Take 1 tablet by mouth 2 times daily (before meals) May substitute for generic or covered medication     Dispense:  60 tablet     Refill:  3     Return in about 3 months (around 1/23/2021) for Testosterone .   Subjective:     Chief Complaint   Patient presents with    New Patient    Obesity    Hypogonadism     Vitals:    10/23/20 1441   BP: 129/82   Pulse: 60   SpO2: 95%   Weight: (!) 472 lb (214.1 kg)   Height: 5' 6\" (1.676 m)     Wt Readings from Last 3 Encounters:   10/23/20 (!) 472 lb (214.1 kg)   10/09/20 (!) 435 lb (197.3 kg)   10/07/19 (!) 434 lb (196.9 kg)     BP Readings from Last 3 Encounters:   10/23/20 129/82   10/09/20 118/76   10/07/19 (!) 138/91     Eli Flores is a 76-year-old high functioning autistic obese male presenting today for evaluation management and treatment of insulin resistance and low testosterone levels. Has had multiple laboratory stat tests which all were in the past were well below 300. Insulin levels were also tested and those were high. Fasting glucose numbers are good and his A1c is within normal limits. Had a long conversation with Dee Dee Goode and his mom regarding insulin resistance and the potential for diabetes in the future. Going to start him on some low-dose Metformin this may help with the weight also. Also get a start him on testosterone injections which may help with his energy level and he may be more interested in daily activity again for weight loss also. Past Medical History:   Diagnosis Date    Autism spectrum disorder     Hypogonadism male     Obesity      History reviewed. No pertinent surgical history.   Social History     Socioeconomic History    Marital status: Single     Spouse name: Not on file    Number of children: Not on file    Years of education: Not on file    Highest education level: Not on file   Occupational History    Not on file   Social Needs    Financial resource strain: Not on file    Food insecurity     Worry: Not on file     Inability: Not on file    Transportation needs     Medical: Not on file     Non-medical: Not on file   Tobacco Use    Smoking status: Never Smoker    Smokeless tobacco: Never Used   Substance and Sexual Activity    Alcohol use: Not on file    Drug use: Not on file    Sexual activity: Not on file   Lifestyle    Physical activity     Days per week: Not on file     Minutes per session: Not on file    Stress: Not on file   Relationships    Social connections     Talks on phone: Not on file     Gets together: Not on file     Attends Confucianist service: Not on file     Active member of club or organization: Not on file     Attends meetings of clubs or organizations: Not on file     Relationship status: Not on file    Intimate partner violence     Fear of current or ex partner: Not on file     Emotionally abused: Not on file     Physically abused: Not on file     Forced sexual activity: Not on file   Other Topics Concern    Not on file   Social History Narrative    Not on file     Family History   Problem Relation Age of Onset    Diabetes type 2  Mother     Hypertension Mother     Diabetes type 2  Maternal Grandmother     Heart Failure Maternal Grandmother      No Known Allergies    Current Outpatient Medications:     Testosterone Cypionate 200 MG/ML SOLN, Inject 1 mL as directed every 14 days for 24 doses, Disp: 1 vial, Rfl: 3    metFORMIN (GLUCOPHAGE XR) 500 MG extended release tablet, Take 1 tablet by mouth 2 times daily (before meals) May substitute for generic or covered medication, Disp: 60 tablet, Rfl: 3  Lab Results   Component Value Date     10/09/2020    K 4.2 10/09/2020     10/09/2020    CO2 26 10/09/2020    BUN 16 10/09/2020    CREATININE 0.91 10/09/2020    GLUCOSE 74 10/09/2020    CALCIUM 10.1 (H) 10/09/2020    PROT 8.5 (H) 10/09/2020    LABALBU 4.3 10/09/2020    BILITOT <0.2 10/09/2020    ALKPHOS 80 10/09/2020    AST 17 10/09/2020    ALT 22 10/09/2020    LABGLOM >60.0 10/09/2020    GFRAA >60.0 10/09/2020    GLOB 4.2 (H) 10/09/2020     Lab Results   Component Value Date    WBC 6.1 10/09/2020    HGB 13.6 (L) 10/09/2020    HCT 42.7 10/09/2020    MCV 81.4 10/09/2020     10/09/2020     Lab Results   Component Value Date    LABA1C 5.8 10/09/2020    LABA1C 5.7 04/24/2019    LABA1C 5.8 12/17/2012     No results found for: CHOL  No results found for: TRIG  Lab Results   Component Value Date    HDL 42 04/24/2019     Lab Results   Component Value Date    LDLCALC 88 04/24/2019   Results for Melvyn Dakins (MRN [id number]) as of 10/23/2020 14:55   Ref.  Range 9/17/2019 16:40 10/9/2020 15:07   Vit D, 25-Hydroxy Latest Ref Range: 30.0 - 100.0 ng/mL 22.8 (L) 23.5 (L)     No results found for: LABVLDL, VLDL  No results found for: [facility]  Lab Results   Component Value Date    TESTM 150 (L)

## 2020-10-23 NOTE — PATIENT INSTRUCTIONS
Start Metformin 500 mg XR one tablet a day, increase to 2 tablets in two weeks if no diarrhea   Testosterone 200 mg every 14 days- testosterone clinic   Labs in 3 months  Follow up 3 months

## 2020-10-27 LAB
SEX HORMONE BINDING GLOBULIN: 24 NMOL/L (ref 11–80)
TESTOSTERONE FREE PERCENT: 2 % (ref 1.6–2.9)
TESTOSTERONE FREE, CALC: 26 PG/ML (ref 47–244)
TESTOSTERONE TOTAL-MALE: 130 NG/DL (ref 300–1080)

## 2020-11-07 RX ORDER — ERGOCALCIFEROL 1.25 MG/1
50000 CAPSULE ORAL WEEKLY
Qty: 12 CAPSULE | Refills: 1 | Status: SHIPPED | OUTPATIENT
Start: 2020-11-07 | End: 2021-06-08

## 2021-02-11 ENCOUNTER — OFFICE VISIT (OUTPATIENT)
Dept: PULMONOLOGY | Age: 31
End: 2021-02-11
Payer: MEDICARE

## 2021-02-11 VITALS
OXYGEN SATURATION: 98 % | HEART RATE: 103 BPM | WEIGHT: 315 LBS | TEMPERATURE: 97 F | HEIGHT: 67 IN | DIASTOLIC BLOOD PRESSURE: 76 MMHG | BODY MASS INDEX: 49.44 KG/M2 | RESPIRATION RATE: 16 BRPM | SYSTOLIC BLOOD PRESSURE: 134 MMHG

## 2021-02-11 DIAGNOSIS — Z99.89 OSA ON CPAP: Primary | ICD-10-CM

## 2021-02-11 DIAGNOSIS — G47.33 OSA ON CPAP: Primary | ICD-10-CM

## 2021-02-11 DIAGNOSIS — E66.01 MORBID OBESITY (HCC): ICD-10-CM

## 2021-02-11 PROCEDURE — 99204 OFFICE O/P NEW MOD 45 MIN: CPT | Performed by: INTERNAL MEDICINE

## 2021-02-11 ASSESSMENT — ENCOUNTER SYMPTOMS
SORE THROAT: 0
VOMITING: 0
NAUSEA: 0
VOICE CHANGE: 0
COUGH: 0
SHORTNESS OF BREATH: 0
DIARRHEA: 0
EYE ITCHING: 0
WHEEZING: 0
ABDOMINAL PAIN: 0
RHINORRHEA: 0
CHEST TIGHTNESS: 0

## 2021-02-11 NOTE — PROGRESS NOTES
Stress: Not on file   Relationships    Social connections     Talks on phone: Not on file     Gets together: Not on file     Attends Gnosticism service: Not on file     Active member of club or organization: Not on file     Attends meetings of clubs or organizations: Not on file     Relationship status: Not on file    Intimate partner violence     Fear of current or ex partner: Not on file     Emotionally abused: Not on file     Physically abused: Not on file     Forced sexual activity: Not on file   Other Topics Concern    Not on file   Social History Narrative    Not on file         Review of Systems   Constitutional: Negative for chills, diaphoresis, fatigue and fever. HENT: Negative for congestion, mouth sores, nosebleeds, postnasal drip, rhinorrhea, sneezing, sore throat and voice change. Eyes: Negative for itching and visual disturbance. Respiratory: Negative for cough, chest tightness, shortness of breath and wheezing. Cardiovascular: Negative. Negative for chest pain, palpitations and leg swelling. Gastrointestinal: Negative for abdominal pain, diarrhea, nausea and vomiting. Genitourinary: Negative for difficulty urinating and hematuria. Musculoskeletal: Negative for arthralgias, joint swelling and myalgias. Skin: Negative for rash. Allergic/Immunologic: Negative for environmental allergies. Neurological: Negative for dizziness, tremors, weakness and headaches. Psychiatric/Behavioral: Positive for sleep disturbance. Negative for behavioral problems. :     Vitals:    02/11/21 1548   BP: 134/76   Pulse: 103   Resp: 16   Temp: 97 °F (36.1 °C)   TempSrc: Temporal   SpO2: 98%   Weight: (!) 480 lb (217.7 kg)   Height: 5' 7\" (1.702 m)     Wt Readings from Last 3 Encounters:   02/11/21 (!) 480 lb (217.7 kg)   10/23/20 (!) 472 lb (214.1 kg)   10/09/20 (!) 435 lb (197.3 kg)         Physical Exam  Constitutional:       Appearance: He is well-developed. He is obese.    HENT:      Head: Normocephalic and atraumatic. Nose: Nose normal.      Mouth/Throat:      Comments: Narrow airway. Eyes:      Conjunctiva/sclera: Conjunctivae normal.      Pupils: Pupils are equal, round, and reactive to light. Neck:      Thyroid: No thyromegaly. Vascular: No JVD. Trachea: No tracheal deviation. Cardiovascular:      Rate and Rhythm: Normal rate and regular rhythm. Heart sounds: No murmur. No friction rub. No gallop. Pulmonary:      Effort: Pulmonary effort is normal. No respiratory distress. Breath sounds: Normal breath sounds. No wheezing or rales. Chest:      Chest wall: No tenderness. Abdominal:      General: There is no distension. Musculoskeletal: Normal range of motion. Lymphadenopathy:      Cervical: No cervical adenopathy. Skin:     General: Skin is warm and dry. Findings: No rash. Neurological:      Mental Status: He is alert and oriented to person, place, and time. Cranial Nerves: No cranial nerve deficit. Psychiatric:         Behavior: Behavior normal.         Current Outpatient Medications   Medication Sig Dispense Refill    vitamin D (ERGOCALCIFEROL) 1.25 MG (39195 UT) CAPS capsule Take 1 capsule by mouth once a week 12 capsule 1    Testosterone Cypionate 200 MG/ML SOLN Inject 1 mL as directed every 14 days for 24 doses 1 vial 3    metFORMIN (GLUCOPHAGE XR) 500 MG extended release tablet Take 1 tablet by mouth 2 times daily (before meals) May substitute for generic or covered medication 60 tablet 3     No current facility-administered medications for this visit. Assessment/Plan:     1. IGNACIO on CPAP  He is using CPAP with  9  centimeters of H2O with heated humidity. He is using CPAP for about   About 10  hours every night. He is using CPAP with  Full face  Mask. He had not received CPAP supply since 2015 from [employer] He does not sleep without CPAP. He said sleep is restful with the CPAP use. He is compliant with CPAP therapy and benefiting

## 2021-03-11 ENCOUNTER — HOSPITAL ENCOUNTER (OUTPATIENT)
Dept: SLEEP CENTER | Age: 31
Discharge: HOME | End: 2021-03-13
Payer: MEDICARE

## 2021-03-11 PROCEDURE — 95810 POLYSOM 6/> YRS 4/> PARAM: CPT

## 2021-03-12 PROCEDURE — 95810 POLYSOM 6/> YRS 4/> PARAM: CPT | Performed by: INTERNAL MEDICINE

## 2021-03-17 ENCOUNTER — OFFICE VISIT (OUTPATIENT)
Dept: PULMONOLOGY | Age: 31
End: 2021-03-17
Payer: MEDICARE

## 2021-03-17 VITALS
WEIGHT: 315 LBS | OXYGEN SATURATION: 95 % | DIASTOLIC BLOOD PRESSURE: 98 MMHG | TEMPERATURE: 97.1 F | HEIGHT: 66 IN | SYSTOLIC BLOOD PRESSURE: 152 MMHG | HEART RATE: 101 BPM | BODY MASS INDEX: 50.62 KG/M2 | RESPIRATION RATE: 18 BRPM

## 2021-03-17 DIAGNOSIS — G47.10 HYPERSOMNIA: ICD-10-CM

## 2021-03-17 DIAGNOSIS — Z99.89 OSA ON CPAP: ICD-10-CM

## 2021-03-17 DIAGNOSIS — Z99.89 OSA ON CPAP: Primary | ICD-10-CM

## 2021-03-17 DIAGNOSIS — E66.01 MORBID OBESITY (HCC): ICD-10-CM

## 2021-03-17 DIAGNOSIS — G47.33 OSA ON CPAP: Primary | ICD-10-CM

## 2021-03-17 DIAGNOSIS — G47.33 OSA ON CPAP: ICD-10-CM

## 2021-03-17 PROCEDURE — 99214 OFFICE O/P EST MOD 30 MIN: CPT | Performed by: INTERNAL MEDICINE

## 2021-03-17 ASSESSMENT — ENCOUNTER SYMPTOMS
CHEST TIGHTNESS: 0
ABDOMINAL PAIN: 0
NAUSEA: 0
COUGH: 0
SHORTNESS OF BREATH: 0
VOMITING: 0
DIARRHEA: 0
VOICE CHANGE: 0
SORE THROAT: 0
WHEEZING: 0
RHINORRHEA: 0
EYE ITCHING: 0

## 2021-03-17 NOTE — PROGRESS NOTES
Subjective:     Cornelia Nguyễn is a 31 y.o. male who complains today of:     Chief Complaint   Patient presents with    Follow-up     three week f/u for Sleep Study results per Dr. Grant Marvin. HPI  He need new CPAP currently is on CPAP therapy at 9 cm of H2O. Kathy Bentley According to his mother he was  diagnose with  IGNACIO at [address]. He went for sleep study and reported severe IGNACIO with ,   after reviewing study I talked to mother and advise him to come to office regarding sleep study result  He is using CPAP with  9  centimeters of H2O with heated humidity. He is using CPAP for about   About 10  hours every night. He is using CPAP with  Full face  Mask. As per mother , he does not sleep without CPAP. He is still having significant hypersomnia. I am not sure his CPAP  pressure need to change or may need BiPAP therapy. So CPAP titration study is requested. He still has hypersomnia and takes nap even with CPAP use   I called sleep lab and scheduled next week. As per mother he is diagnose with narcolepsy but not sure. He is not on any stimulant therapy. He goes to [facility]. Allergies:  Patient has no known allergies. Past Medical History:   Diagnosis Date    Autism spectrum disorder     Hypogonadism male     Obesity      No past surgical history on file.   Family History   Problem Relation Age of Onset    Diabetes type 2  Mother     Hypertension Mother     Diabetes type 2  Maternal Grandmother     Heart Failure Maternal Grandmother      Social History     Socioeconomic History    Marital status: Single     Spouse name: Not on file    Number of children: Not on file    Years of education: Not on file    Highest education level: Not on file   Occupational History    Not on file   Social Needs    Financial resource strain: Not on file    Food insecurity     Worry: Not on file     Inability: Not on file    Transportation needs     Medical: Not on file     Non-medical: Not on file Tobacco Use    Smoking status: Never Smoker    Smokeless tobacco: Never Used   Substance and Sexual Activity    Alcohol use: Not on file    Drug use: Not on file    Sexual activity: Not on file   Lifestyle    Physical activity     Days per week: Not on file     Minutes per session: Not on file    Stress: Not on file   Relationships    Social connections     Talks on phone: Not on file     Gets together: Not on file     Attends Gnosticism service: Not on file     Active member of club or organization: Not on file     Attends meetings of clubs or organizations: Not on file     Relationship status: Not on file    Intimate partner violence     Fear of current or ex partner: Not on file     Emotionally abused: Not on file     Physically abused: Not on file     Forced sexual activity: Not on file   Other Topics Concern    Not on file   Social History Narrative    Not on file         Review of Systems   Constitutional: Negative for chills, diaphoresis, fatigue and fever. HENT: Negative for congestion, mouth sores, nosebleeds, postnasal drip, rhinorrhea, sneezing, sore throat and voice change. Eyes: Negative for itching and visual disturbance. Respiratory: Negative for cough, chest tightness, shortness of breath and wheezing. Cardiovascular: Negative. Negative for chest pain, palpitations and leg swelling. Gastrointestinal: Negative for abdominal pain, diarrhea, nausea and vomiting. Genitourinary: Negative for difficulty urinating and hematuria. Musculoskeletal: Negative for arthralgias, joint swelling and myalgias. Skin: Negative for rash. Allergic/Immunologic: Negative for environmental allergies. Neurological: Negative for dizziness, tremors, weakness and headaches. Psychiatric/Behavioral: Positive for sleep disturbance. Negative for behavioral problems.         Hypersomnia         :     Vitals:    03/17/21 0929 03/17/21 0948   BP: (!) 152/98 (!) 152/98   Pulse: 101    Resp: 18 Temp: 97.1 °F (36.2 °C)    SpO2: 95%    Weight: (!) 495 lb (224.5 kg)    Height: 5' 6\" (1.676 m)      Wt Readings from Last 3 Encounters:   03/17/21 (!) 495 lb (224.5 kg)   02/11/21 (!) 480 lb (217.7 kg)   10/23/20 (!) 472 lb (214.1 kg)         Physical Exam  Constitutional:       Appearance: He is well-developed. He is obese. HENT:      Head: Normocephalic and atraumatic. Nose: Nose normal.      Mouth/Throat:      Comments: Mallampati  IV    Eyes:      Conjunctiva/sclera: Conjunctivae normal.      Pupils: Pupils are equal, round, and reactive to light. Neck:      Thyroid: No thyromegaly. Vascular: No JVD. Trachea: No tracheal deviation. Cardiovascular:      Rate and Rhythm: Normal rate and regular rhythm. Heart sounds: No murmur. No friction rub. No gallop. Pulmonary:      Effort: Pulmonary effort is normal. No respiratory distress. Breath sounds: Normal breath sounds. No wheezing or rales. Chest:      Chest wall: No tenderness. Abdominal:      General: There is no distension. Musculoskeletal: Normal range of motion. Lymphadenopathy:      Cervical: No cervical adenopathy. Skin:     General: Skin is warm and dry. Findings: No rash. Neurological:      Mental Status: He is alert and oriented to person, place, and time. Cranial Nerves: No cranial nerve deficit.    Psychiatric:         Behavior: Behavior normal.         Current Outpatient Medications   Medication Sig Dispense Refill    vitamin D (ERGOCALCIFEROL) 1.25 MG (74584 UT) CAPS capsule Take 1 capsule by mouth once a week (Patient not taking: Reported on 3/17/2021) 12 capsule 1    Testosterone Cypionate 200 MG/ML SOLN Inject 1 mL as directed every 14 days for 24 doses (Patient not taking: Reported on 3/17/2021) 1 vial 3    metFORMIN (GLUCOPHAGE XR) 500 MG extended release tablet Take 1 tablet by mouth 2 times daily (before meals) May substitute for generic or covered medication (Patient not taking: Reported on 3/17/2021) 60 tablet 3     No current facility-administered medications for this visit. Assessment/Plan:     1. IGNACIO on CPAP  He went for sleep study and reported severe IGNACIO with , after reviewing study I talked to mother and advise him to come to office regarding sleep study resultHe is using CPAP with  9  centimeters of H2O with heated humidity. He is using CPAP for about   About 10  hours every night. He is using CPAP with  Full face  Mask. As per mother , he does not sleep without CPAP. Will request CPAP titration study for therapeutic CPAP or BiPAP level. - Sleep Study with PAP Titration; Future    2. Hypersomnia  Patient is having significant hypersomnia even he uses CPAP for 10 hours every night and even during naps he is still very sleepy at times. I am not sure CPAP pressure is optimal.  Repeat titration study is requested. After therapeutic CPAP update will change CPAP pressure and given new CPAP or BiPAP as needed. Patient may need work-up for narcolepsy if considered continue to have significant hypersomnia with therapeutic CPAP level. 3. Morbid obesity ([address]  He is advised try to lose weight. obesity related risk explained to the patient ,  Current weight:  (!) 495 lb (224.5 kg) Lbs. BMI:  Body mass index is 79.9 kg/m². Suggested weight control approaches, including dietary changes , exercise, behavioral modification. Return in about 2 weeks (around 3/31/2021).       Faizan Mora MD

## 2021-04-07 ENCOUNTER — HOSPITAL ENCOUNTER (OUTPATIENT)
Dept: SLEEP CENTER | Age: 31
Discharge: HOME | End: 2021-04-09
Payer: MEDICARE

## 2021-04-07 PROCEDURE — 95811 POLYSOM 6/>YRS CPAP 4/> PARM: CPT | Performed by: INTERNAL MEDICINE

## 2021-04-07 PROCEDURE — 95811 POLYSOM 6/>YRS CPAP 4/> PARM: CPT

## 2021-04-15 DIAGNOSIS — G47.33 OSA ON CPAP: ICD-10-CM

## 2021-04-15 DIAGNOSIS — Z99.89 OSA ON CPAP: ICD-10-CM

## 2021-05-11 ENCOUNTER — OFFICE VISIT (OUTPATIENT)
Dept: PULMONOLOGY | Age: 31
End: 2021-05-11
Payer: MEDICARE

## 2021-05-11 VITALS
TEMPERATURE: 98.5 F | WEIGHT: 315 LBS | DIASTOLIC BLOOD PRESSURE: 78 MMHG | BODY MASS INDEX: 50.62 KG/M2 | OXYGEN SATURATION: 99 % | HEART RATE: 89 BPM | HEIGHT: 66 IN | SYSTOLIC BLOOD PRESSURE: 139 MMHG

## 2021-05-11 DIAGNOSIS — E66.01 MORBID OBESITY (HCC): ICD-10-CM

## 2021-05-11 DIAGNOSIS — G47.10 HYPERSOMNIA: ICD-10-CM

## 2021-05-11 DIAGNOSIS — Z99.89 OSA ON CPAP: Primary | ICD-10-CM

## 2021-05-11 DIAGNOSIS — G47.33 OSA ON CPAP: Primary | ICD-10-CM

## 2021-05-11 PROCEDURE — 99214 OFFICE O/P EST MOD 30 MIN: CPT | Performed by: INTERNAL MEDICINE

## 2021-05-11 ASSESSMENT — ENCOUNTER SYMPTOMS
EYE ITCHING: 0
WHEEZING: 0
SHORTNESS OF BREATH: 0
VOMITING: 0
NAUSEA: 0
CHEST TIGHTNESS: 0
SORE THROAT: 0
RHINORRHEA: 0
ABDOMINAL PAIN: 0
COUGH: 0
VOICE CHANGE: 0
DIARRHEA: 0

## 2021-07-20 ENCOUNTER — OFFICE VISIT (OUTPATIENT)
Dept: PULMONOLOGY | Age: 31
End: 2021-07-20
Payer: MEDICARE

## 2021-07-20 VITALS
TEMPERATURE: 98 F | SYSTOLIC BLOOD PRESSURE: 139 MMHG | DIASTOLIC BLOOD PRESSURE: 74 MMHG | HEART RATE: 86 BPM | BODY MASS INDEX: 50.62 KG/M2 | WEIGHT: 315 LBS | OXYGEN SATURATION: 100 % | HEIGHT: 66 IN

## 2021-07-20 DIAGNOSIS — Z99.89 OSA ON CPAP: Primary | ICD-10-CM

## 2021-07-20 DIAGNOSIS — G47.10 HYPERSOMNIA: ICD-10-CM

## 2021-07-20 DIAGNOSIS — E66.01 MORBID OBESITY (HCC): ICD-10-CM

## 2021-07-20 DIAGNOSIS — G47.33 OSA ON CPAP: Primary | ICD-10-CM

## 2021-07-20 PROCEDURE — 99214 OFFICE O/P EST MOD 30 MIN: CPT | Performed by: INTERNAL MEDICINE

## 2021-07-20 ASSESSMENT — ENCOUNTER SYMPTOMS
DIARRHEA: 0
VOICE CHANGE: 0
RHINORRHEA: 0
EYE ITCHING: 0
COUGH: 0
ABDOMINAL PAIN: 0
WHEEZING: 0
SORE THROAT: 0
SHORTNESS OF BREATH: 0
VOMITING: 0
NAUSEA: 0
CHEST TIGHTNESS: 0

## 2021-07-20 NOTE — PROGRESS NOTES
Subjective:     Dee Dee Engle is a 27 y.o. male who complains today of:     Chief Complaint   Patient presents with    Sleep Apnea     2 month f/u       HPI  He is using CPAP with  10  centimeters of H2O with heated humidity. He is using CPAP for about  6-7 hours every night. He is using CPAP with full face  Mask. He said  sleep is restful with the CPAP use. He is compliant with CPAP therapy and benefiting with CPAP use. No snoring with CPAP use. No complaint of daytime sleepiness or tiredness with CPAP use. He denies taking naps. He denies difficulty falling asleep or staying asleep. I reviewed compliance report with patient regarding CPAP therapy. He is using  CPAP for30 days out of 30 days  Average usage of days used is 6hours and 20 min , average AHI  8.1 with CPAP use. Allergies:  Patient has no known allergies. Past Medical History:   Diagnosis Date    Autism spectrum disorder     Hypogonadism male     Obesity      No past surgical history on file.   Family History   Problem Relation Age of Onset    Diabetes type 2  Mother     Hypertension Mother     Diabetes type 2  Maternal Grandmother     Heart Failure Maternal Grandmother      Social History     Socioeconomic History    Marital status: Single     Spouse name: Not on file    Number of children: Not on file    Years of education: Not on file    Highest education level: Not on file   Occupational History    Not on file   Tobacco Use    Smoking status: Never Smoker    Smokeless tobacco: Never Used   Substance and Sexual Activity    Alcohol use: Not on file    Drug use: Not on file    Sexual activity: Not on file   Other Topics Concern    Not on file   Social History Narrative    Not on file     Social Determinants of Health     Financial Resource Strain:     Difficulty of Paying Living Expenses:    Food Insecurity:     Worried About Running Out of Food in the Last Year:     [address] in the Last Year: Transportation Needs:     Lack of Transportation (Medical):  Lack of Transportation (Non-Medical):    Physical Activity:     Days of Exercise per Week:     Minutes of Exercise per Session:    Stress:     Feeling of Stress :    Social Connections:     Frequency of Communication with Friends and Family:     Frequency of Social Gatherings with Friends and Family:     Attends Adventism Services:     Active Member of Clubs or Organizations:     Attends Club or Organization Meetings:     Marital Status:    Intimate Partner Violence:     Fear of Current or Ex-Partner:     Emotionally Abused:     Physically Abused:     Sexually Abused:          Review of Systems   Constitutional: Negative for chills, diaphoresis, fatigue and fever. HENT: Negative for congestion, mouth sores, nosebleeds, postnasal drip, rhinorrhea, sneezing, sore throat and voice change. Eyes: Negative for itching and visual disturbance. Respiratory: Negative for cough, chest tightness, shortness of breath and wheezing. Cardiovascular: Negative. Negative for chest pain, palpitations and leg swelling. Gastrointestinal: Negative for abdominal pain, diarrhea, nausea and vomiting. Genitourinary: Negative for difficulty urinating and hematuria. Musculoskeletal: Negative for arthralgias, joint swelling and myalgias. Skin: Negative for rash. Allergic/Immunologic: Negative for environmental allergies. Neurological: Negative for dizziness, tremors, weakness and headaches. Psychiatric/Behavioral: Positive for sleep disturbance. Negative for behavioral problems.          :     Vitals:    07/20/21 1555   BP: 139/74   Pulse: 86   Temp: 98 °F (36.7 °C)   SpO2: 100%   Weight: (!) 480 lb (217.7 kg)   Height: 5' 6\" (1.676 m)     Wt Readings from Last 3 Encounters:   07/20/21 (!) 480 lb (217.7 kg)   05/11/21 (!) 487 lb (220.9 kg)   03/17/21 (!) 495 lb (224.5 kg)         Physical Exam  Constitutional:       Appearance: He is well-developed. He is obese. HENT:      Head: Normocephalic and atraumatic. Nose: Nose normal.   Eyes:      Conjunctiva/sclera: Conjunctivae normal.      Pupils: Pupils are equal, round, and reactive to light. Neck:      Thyroid: No thyromegaly. Vascular: No JVD. Trachea: No tracheal deviation. Cardiovascular:      Rate and Rhythm: Normal rate and regular rhythm. Heart sounds: No murmur heard. No friction rub. No gallop. Pulmonary:      Effort: Pulmonary effort is normal. No respiratory distress. Breath sounds: Normal breath sounds. No wheezing or rales. Chest:      Chest wall: No tenderness. Abdominal:      General: There is no distension. Musculoskeletal:         General: Normal range of motion. Lymphadenopathy:      Cervical: No cervical adenopathy. Skin:     General: Skin is warm and dry. Findings: No rash. Neurological:      Mental Status: He is alert and oriented to person, place, and time. Cranial Nerves: No cranial nerve deficit. Psychiatric:         Behavior: Behavior normal.         Current Outpatient Medications   Medication Sig Dispense Refill    CPAP Machine MISC by Does not apply route New CPAP with 10 cm and full face mask 1 each 0    vitamin D (ERGOCALCIFEROL) 1.25 MG (81393 UT) CAPS capsule TAKE 1 CAPSULE BY MOUTH ONE TIME PER WEEK (Patient not taking: Reported on 7/20/2021) 12 capsule 1    Testosterone Cypionate 200 MG/ML SOLN Inject 1 mL as directed every 14 days for 24 doses (Patient not taking: Reported on 5/11/2021) 1 vial 3    metFORMIN (GLUCOPHAGE XR) 500 MG extended release tablet Take 1 tablet by mouth 2 times daily (before meals) May substitute for generic or covered medication (Patient not taking: Reported on 7/20/2021) 60 tablet 3     No current facility-administered medications for this visit. Assessment/Plan:     1. IGNACIO on CPAP  He is using CPAP with  10  centimeters of H2O with heated humidity. He is using CPAP for about  6-7 hours every night. He is using CPAP with full face  Mask. He said  sleep is restful with the CPAP use. He is compliant with CPAP therapy and benefiting with CPAP use. continue CPAP as before. I reviewed compliance report with patient regarding CPAP therapy. He is using  CPAP for30 days out of 30 days  Average usage of days used is 6hours and 20 min , average AHI  8.1 with CPAP use. Counseling: CPAP/BiPAP uses, He advised to use CPAP at least 5-6 hours every night. Sleep hygiene:Avoid supine sleep, sleep on  sides. Avoid  sleep deprivation. Explained sleep hygiene. Advice to avoid Alcohol and sedative    Time spend over 30 min. Face to face. with greater than 50 % time with CPAP therapy including review compliance, counseling and advised regarding CPAP therapy. 2. Hypersomnia  He is much better with CPAP     3. Morbid obesity ([address]  He is advised try to lose weight. obesity related risk explained to the patient ,  Current weight:  (!) 480 lb (217.7 kg) Lbs. BMI:  Body mass index is 77.47 kg/m². Suggested weight control approaches, including dietary changes , exercise, behavioral modification. [facility]      Return in about 4 months (around 11/20/2021) for jovan.       Federico Starks MD

## 2021-08-13 DIAGNOSIS — G47.33 OSA (OBSTRUCTIVE SLEEP APNEA): ICD-10-CM

## 2021-08-13 DIAGNOSIS — E29.1 HYPOGONADISM MALE: ICD-10-CM

## 2021-08-13 DIAGNOSIS — E88.81 INSULIN RESISTANCE: ICD-10-CM

## 2021-08-13 DIAGNOSIS — E66.01 OBESITIES, MORBID (HCC): Primary | ICD-10-CM

## 2021-12-29 ENCOUNTER — OFFICE VISIT (OUTPATIENT)
Dept: PULMONOLOGY | Age: 31
End: 2021-12-29
Payer: MEDICARE

## 2021-12-29 VITALS
HEART RATE: 81 BPM | BODY MASS INDEX: 50.62 KG/M2 | DIASTOLIC BLOOD PRESSURE: 85 MMHG | HEIGHT: 66 IN | OXYGEN SATURATION: 97 % | TEMPERATURE: 98.2 F | WEIGHT: 315 LBS | SYSTOLIC BLOOD PRESSURE: 142 MMHG

## 2021-12-29 DIAGNOSIS — G47.33 OSA ON CPAP: Primary | ICD-10-CM

## 2021-12-29 DIAGNOSIS — E66.01 MORBID OBESITY (HCC): ICD-10-CM

## 2021-12-29 DIAGNOSIS — Z99.89 OSA ON CPAP: Primary | ICD-10-CM

## 2021-12-29 PROCEDURE — 99214 OFFICE O/P EST MOD 30 MIN: CPT | Performed by: INTERNAL MEDICINE

## 2021-12-29 ASSESSMENT — ENCOUNTER SYMPTOMS
WHEEZING: 0
VOMITING: 0
EYE ITCHING: 0
VOICE CHANGE: 0
ABDOMINAL PAIN: 0
SORE THROAT: 0
CHEST TIGHTNESS: 0
DIARRHEA: 0
SHORTNESS OF BREATH: 0
COUGH: 0
RHINORRHEA: 0
NAUSEA: 0

## 2021-12-29 NOTE — PROGRESS NOTES
on file    [address] in the Last Year: Not on file   Transportation Needs:     Lack of Transportation (Medical): Not on file    Lack of Transportation (Non-Medical): Not on file   Physical Activity:     Days of Exercise per Week: Not on file    Minutes of Exercise per Session: Not on file   Stress:     Feeling of Stress : Not on file   Social Connections:     Frequency of Communication with Friends and Family: Not on file    Frequency of Social Gatherings with Friends and Family: Not on file    Attends Sikhism Services: Not on file    Active Member of [address] [place] [address] [employer] or Organizations: Not on file    Attends Club or Organization Meetings: Not on file    Marital Status: Not on file   Intimate Partner Violence:     Fear of Current or Ex-Partner: Not on file    Emotionally Abused: Not on file    Physically Abused: Not on file    Sexually Abused: Not on file   Housing Stability:     Unable to Pay for Housing in the Last Year: Not on file    Number of [place] in the Last Year: Not on file    Unstable Housing in the Last Year: Not on file         Review of Systems   Constitutional: Negative for chills, diaphoresis, fatigue and fever. HENT: Negative for congestion, mouth sores, nosebleeds, postnasal drip, rhinorrhea, sneezing, sore throat and voice change. Eyes: Negative for itching and visual disturbance. Respiratory: Negative for cough, chest tightness, shortness of breath and wheezing. Cardiovascular: Negative. Negative for chest pain, palpitations and leg swelling. Gastrointestinal: Negative for abdominal pain, diarrhea, nausea and vomiting. Genitourinary: Negative for difficulty urinating and hematuria. Musculoskeletal: Negative for arthralgias, joint swelling and myalgias. Skin: Negative for rash. Allergic/Immunologic: Negative for environmental allergies. Neurological: Negative for dizziness, tremors, weakness and headaches.    Psychiatric/Behavioral: Positive for sleep disturbance. Negative for behavioral problems. :     Vitals:    12/29/21 1319 12/29/21 1324   BP: (!) 150/91 (!) 142/85   Site: Right Lower Arm Left Lower Arm   Position: Sitting Sitting   Cuff Size: Large Adult Large Adult   Pulse: 94 81   Temp: 98.2 °F (36.8 °C)    SpO2: 97%    Weight: (!) 470 lb (213.2 kg)    Height: 5' 6\" (1.676 m)      Wt Readings from Last 3 Encounters:   12/29/21 (!) 470 lb (213.2 kg)   07/20/21 (!) 480 lb (217.7 kg)   05/11/21 (!) 487 lb (220.9 kg)         Physical Exam  Constitutional:       Appearance: He is well-developed. He is obese. HENT:      Head: Normocephalic and atraumatic. Nose: Nose normal.   Eyes:      Conjunctiva/sclera: Conjunctivae normal.      Pupils: Pupils are equal, round, and reactive to light. Neck:      Thyroid: No thyromegaly. Vascular: No JVD. Trachea: No tracheal deviation. Cardiovascular:      Rate and Rhythm: Normal rate and regular rhythm. Heart sounds: No murmur heard. No friction rub. No gallop. Pulmonary:      Effort: Pulmonary effort is normal. No respiratory distress. Breath sounds: Normal breath sounds. No wheezing or rales. Chest:      Chest wall: No tenderness. Abdominal:      General: There is no distension. Musculoskeletal:         General: Normal range of motion. Lymphadenopathy:      Cervical: No cervical adenopathy. Skin:     General: Skin is warm and dry. Findings: No rash. Neurological:      Mental Status: He is alert and oriented to person, place, and time. Cranial Nerves: No cranial nerve deficit.    Psychiatric:         Behavior: Behavior normal.         Current Outpatient Medications   Medication Sig Dispense Refill    CPAP Machine MISC by Does not apply route New CPAP with 10 cm and full face mask 1 each 0    vitamin D (ERGOCALCIFEROL) 1.25 MG (55807 UT) CAPS capsule TAKE 1 CAPSULE BY MOUTH ONE TIME PER WEEK (Patient not taking: Reported on 7/20/2021) 12 capsule 1    Testosterone Cypionate 200 MG/ML SOLN Inject 1 mL as directed every 14 days for 24 doses (Patient not taking: Reported on 5/11/2021) 1 vial 3    metFORMIN (GLUCOPHAGE XR) 500 MG extended release tablet Take 1 tablet by mouth 2 times daily (before meals) May substitute for generic or covered medication (Patient not taking: Reported on 7/20/2021) 60 tablet 3     No current facility-administered medications for this visit. Assessment/Plan:     1. IGNACIO on CPAP  He is using CPAP with  10  centimeters of H2O with heated humidity. He is using CPAP for about 7  hours every night. He is using CPAP with  Full face  Mask. He said  sleep is restful with the CPAP use. He is compliant with CPAP therapy and benefiting with CPAP use. No snoring with CPAP use. continue CPAP as before. I reviewed compliance report with patient regarding CPAP therapy. He is using  CPAP for 30 days out of 30 days. Average usage of days used is 7 hours and 12 min , average AHI 0.3  with CPAP use. Counseling: CPAP/BiPAP uses, He advised to use CPAP at least 5-6 hours every night. Driving: He is advised for extreme caution when driving or operating machinery if there is a feeling of drowsiness, especially while driving it is preferable to stop driving and take a brief nap. Sleep hygiene:Avoid supine sleep, sleep on  sides. Avoid  sleep deprivation. Explained sleep hygiene. Advice to avoid Alcohol and sedative    Time spend over 30 min. Face to face. with greater than 50 % time with CPAP therapy including review compliance, counseling and advised regarding CPAP therapy. 2. Morbid obesity ([address]  He is advised try to lose weight. obesity related risk explained to the patient ,  Current weight:  (!) 470 lb (213.2 kg) Lbs. BMI:  Body mass index is 75.86 kg/m². Suggested weight control approaches, including dietary changes , exercise, behavioral modification. He said he will go for bariatric surgery.     Return in about 4 months (around 4/29/2022) for jovan.       Paul Hdz MD

## 2022-05-12 ENCOUNTER — TELEPHONE (OUTPATIENT)
Dept: FAMILY MEDICINE CLINIC | Age: 32
End: 2022-05-12

## 2022-05-12 NOTE — TELEPHONE ENCOUNTER
----- Message from Nathan Jean-Baptiste sent at 5/10/2022  9:44 AM EDT -----  Subject: Appointment Request    Reason for Call: Routine Existing Condition Follow Up    QUESTIONS  Type of Appointment? Established Patient  Reason for appointment request? No appointments available during search  Additional Information for Provider? patient mom wants a follow up   appointment for doctor ladonna to see her son to sign off on weight lost   surgery nothing available until June 7 patient wants to be seen before   that date wants a call back to schedule.  ---------------------------------------------------------------------------  --------------  CALL BACK INFO  What is the best way for the office to contact you? OK to leave message on   voicemail  Preferred Call Back Phone Number? [phone]  ---------------------------------------------------------------------------  --------------  SCRIPT ANSWERS  Relationship to Patient? Parent  Representative Name? verenice  Additional information verified (besides Name and Date of Birth)? Address  Is this follow up request related to routine Diabetes Management? No  Have you been diagnosed with, awaiting test results for, or told that you   are suspected of having COVID-19 (Coronavirus)? (If patient has tested   negative or was tested as a requirement for work, school, or travel and   not based on symptoms, answer no)? No  Within the past 10 days have you developed any of the following symptoms   (answer no if symptoms have been present longer than 10 days or began   more than 10 days ago)? Fever or Chills, Cough, Shortness of breath or   difficulty breathing, Loss of taste or smell, Sore throat, Nasal   congestion, Sneezing or runny nose, Fatigue or generalized body aches   (answer no if pain is specific to a body part e.g. back pain), Diarrhea,   Headache? No  Have you had close contact with someone with COVID-19 in the last 7 days?    No  (Service Expert  click yes below to proceed with Parul Diaz As Usual   Scheduling)?  Yes

## 2022-06-01 ENCOUNTER — OFFICE VISIT (OUTPATIENT)
Dept: PULMONOLOGY | Age: 32
End: 2022-06-01
Payer: MEDICARE

## 2022-06-01 ENCOUNTER — OFFICE VISIT (OUTPATIENT)
Dept: FAMILY MEDICINE CLINIC | Age: 32
End: 2022-06-01
Payer: MEDICARE

## 2022-06-01 VITALS
OXYGEN SATURATION: 94 % | HEART RATE: 96 BPM | TEMPERATURE: 97.2 F | BODY MASS INDEX: 47.74 KG/M2 | HEIGHT: 68 IN | DIASTOLIC BLOOD PRESSURE: 86 MMHG | SYSTOLIC BLOOD PRESSURE: 130 MMHG | WEIGHT: 315 LBS

## 2022-06-01 VITALS
OXYGEN SATURATION: 95 % | BODY MASS INDEX: 50.62 KG/M2 | HEIGHT: 66 IN | TEMPERATURE: 98 F | HEART RATE: 100 BPM | SYSTOLIC BLOOD PRESSURE: 139 MMHG | WEIGHT: 315 LBS | DIASTOLIC BLOOD PRESSURE: 80 MMHG

## 2022-06-01 DIAGNOSIS — Z99.89 OSA ON CPAP: Primary | ICD-10-CM

## 2022-06-01 DIAGNOSIS — E66.01 CLASS 3 OBESITY (HCC): ICD-10-CM

## 2022-06-01 DIAGNOSIS — R03.0 ELEVATED BP WITHOUT DIAGNOSIS OF HYPERTENSION: ICD-10-CM

## 2022-06-01 DIAGNOSIS — Z01.811 PREOPERATIVE RESPIRATORY EXAMINATION: ICD-10-CM

## 2022-06-01 DIAGNOSIS — E66.01 MORBID OBESITY (HCC): ICD-10-CM

## 2022-06-01 DIAGNOSIS — Z02.1 ENCOUNTER FOR PRE-EMPLOYMENT EXAMINATION: Primary | ICD-10-CM

## 2022-06-01 DIAGNOSIS — M17.12 OSTEOARTHRITIS OF LEFT KNEE, UNSPECIFIED OSTEOARTHRITIS TYPE: ICD-10-CM

## 2022-06-01 DIAGNOSIS — G47.33 OSA ON CPAP: Primary | ICD-10-CM

## 2022-06-01 PROCEDURE — 99395 PREV VISIT EST AGE 18-39: CPT | Performed by: INTERNAL MEDICINE

## 2022-06-01 PROCEDURE — 99214 OFFICE O/P EST MOD 30 MIN: CPT | Performed by: INTERNAL MEDICINE

## 2022-06-01 PROCEDURE — 86580 TB INTRADERMAL TEST: CPT | Performed by: INTERNAL MEDICINE

## 2022-06-01 RX ORDER — ADHESIVE BANDAGE 2"X4"
BANDAGE TOPICAL
COMMUNITY
Start: 2022-05-19 | End: 2022-09-15

## 2022-06-01 RX ORDER — TETRACYCLINE HYDROCHLORIDE 500 MG/1
CAPSULE ORAL
COMMUNITY
Start: 2022-05-19 | End: 2022-09-15 | Stop reason: ALTCHOICE

## 2022-06-01 RX ORDER — METRONIDAZOLE 375 MG/1
CAPSULE ORAL
COMMUNITY
Start: 2022-05-20 | End: 2022-09-15 | Stop reason: ALTCHOICE

## 2022-06-01 RX ORDER — BLOOD PRESSURE TEST KIT
1 KIT MISCELLANEOUS DAILY
Qty: 1 KIT | Refills: 0 | Status: SHIPPED | OUTPATIENT
Start: 2022-06-01

## 2022-06-01 RX ORDER — PANTOPRAZOLE SODIUM 40 MG/1
TABLET, DELAYED RELEASE ORAL
COMMUNITY
Start: 2022-05-19 | End: 2022-09-15 | Stop reason: ALTCHOICE

## 2022-06-01 SDOH — ECONOMIC STABILITY: FOOD INSECURITY: WITHIN THE PAST 12 MONTHS, THE FOOD YOU BOUGHT JUST DIDN'T LAST AND YOU DIDN'T HAVE MONEY TO GET MORE.: NEVER TRUE

## 2022-06-01 SDOH — ECONOMIC STABILITY: FOOD INSECURITY: WITHIN THE PAST 12 MONTHS, YOU WORRIED THAT YOUR FOOD WOULD RUN OUT BEFORE YOU GOT MONEY TO BUY MORE.: NEVER TRUE

## 2022-06-01 ASSESSMENT — ENCOUNTER SYMPTOMS
SHORTNESS OF BREATH: 0
NAUSEA: 0
EYE ITCHING: 0
WHEEZING: 0
CHEST TIGHTNESS: 0
NAUSEA: 0
CHEST TIGHTNESS: 0
COUGH: 0
DIARRHEA: 0
VOMITING: 0
RHINORRHEA: 0
ABDOMINAL PAIN: 0
VOICE CHANGE: 0
SHORTNESS OF BREATH: 0
WHEEZING: 0
VOMITING: 0
SORE THROAT: 0
COUGH: 0

## 2022-06-01 ASSESSMENT — PATIENT HEALTH QUESTIONNAIRE - PHQ9
SUM OF ALL RESPONSES TO PHQ QUESTIONS 1-9: 0
SUM OF ALL RESPONSES TO PHQ QUESTIONS 1-9: 0
SUM OF ALL RESPONSES TO PHQ9 QUESTIONS 1 & 2: 0
SUM OF ALL RESPONSES TO PHQ QUESTIONS 1-9: 0
2. FEELING DOWN, DEPRESSED OR HOPELESS: 0
SUM OF ALL RESPONSES TO PHQ QUESTIONS 1-9: 0
1. LITTLE INTEREST OR PLEASURE IN DOING THINGS: 0

## 2022-06-01 ASSESSMENT — SOCIAL DETERMINANTS OF HEALTH (SDOH): HOW HARD IS IT FOR YOU TO PAY FOR THE VERY BASICS LIKE FOOD, HOUSING, MEDICAL CARE, AND HEATING?: NOT VERY HARD

## 2022-06-01 NOTE — PROGRESS NOTES
2022    Cally Lund (:  1990) is a 34 y.o. male, here for a pre-employment physical.    Medical history is significant for Class III Obesity, IGNACIO and Left Knee OA. He reports chronic left knee pain in this setting, which is worse with ambulation and weight bearing. Unable to walk for extended periods due to this. 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 History is also notable for recently diagnosed H.pylori gastritis. He is currently on a 2 week course of triple therapy which he is tolerating well. Blood pressure also reported to be elevated on occasion. Has not been diagnosed with Hypertension previously. Reports no associated headaches or visual disturbance. No chest pain or SOB endorsed. Patient Active Problem List   Diagnosis    Obesities, morbid ([address]    IGNACIO (obstructive sleep apnea)    Insulin resistance    Hypogonadism male    Acute pain of left knee    Lymphedema       Review of Systems   Constitutional: Negative for activity change, chills, diaphoresis, fatigue and fever. Respiratory: Negative for cough, chest tightness, shortness of breath and wheezing. Cardiovascular: Negative for chest pain, palpitations and leg swelling. Gastrointestinal: Negative for nausea and vomiting. Neurological: Negative for dizziness, syncope, light-headedness and headaches. Prior to Visit Medications    Medication Sig Taking?  Authorizing Provider   [employer] STOMACH RELIEF 262 MG chewable tablet TAKE 1 TABLET BY MOUTH FOUR TIMES A DAY Yes Historical Provider, MD   metroNIDAZOLE (FLAGYL) 375 MG capsule TAKE 1 CAPSULE BY MOUTH FOUR TIMES A DAY Yes Historical Provider, MD   tetracycline (ACHROMYCIN;SUMYCIN) 500 MG capsule TAKE 1 CAPSULE BY MOUTH FOUR TIMES A DAY Yes Historical Provider, MD   pantoprazole (PROTONIX) 40 MG tablet TAKE 1 TABLET BY MOUTH TWICE A DAY Yes Historical Provider, MD   Blood Pressure KIT 1 Device by Does not apply route daily Yes Martina Nicole MD   CPAP Machine MISC by Does not apply route New CPAP with 10 cm and full face mask Yes Lenin Benjamin MD   Testosterone Cypionate 200 MG/ML SOLN Inject 1 mL as directed every 14 days for 24 doses  Patient not taking: Reported on 5/11/2021  ROBIN Mcnair        No Known Allergies    Past Medical History:   Diagnosis Date    Autism spectrum disorder     Hypogonadism male     Obesity        No past surgical history on file. Social History     Socioeconomic History    Marital status: Single     Spouse name: Not on file    Number of children: Not on file    Years of education: Not on file    Highest education level: Not on file   Occupational History    Not on file   Tobacco Use    Smoking status: Never Smoker    Smokeless tobacco: Never Used   Substance and Sexual Activity    Alcohol use: Not on file    Drug use: Not on file    Sexual activity: Not on file   Other Topics Concern    Not on file   Social History Narrative    Not on file     Social Determinants of Health     Financial Resource Strain: Low Risk     Difficulty of Paying Living Expenses: Not very hard   Food Insecurity: No Food Insecurity    Worried About Running Out of Food in the Last Year: Never true    [place] of Food in the Last Year: Never true   Transportation Needs:     Lack of Transportation (Medical): Not on file    Lack of Transportation (Non-Medical):  Not on file   Physical Activity:     Days of Exercise per Week: Not on file    Minutes of Exercise per Session: Not on file   Stress:     Feeling of Stress : Not on file   Social Connections:     Frequency of Communication with Friends and Family: Not on file    Frequency of Social Gatherings with Friends and Family: Not on file    Attends Jainism Services: Not on file    Active Member of Clubs or Organizations: Not on file    Attends Club or Organization Meetings: Not on file    Marital Status: Not on file   Intimate Partner Violence:     Fear of Current or Ex-Partner: Not on file    Emotionally Abused: Not on file    Physically Abused: Not on file    Sexually Abused: Not on file   Housing Stability:     Unable to Pay for Housing in the Last Year: Not on file    Number of [place] in the Last Year: Not on file    Unstable Housing in the Last Year: Not on file        Family History   Problem Relation Age of Onset    Diabetes type 2  Mother     Hypertension Mother     Diabetes type 2  Maternal Grandmother     Heart Failure Maternal Grandmother        ADVANCE DIRECTIVE: N, <no information>    Vitals:    06/01/22 1007   BP: 130/86   Site: Left Lower Arm   Position: Sitting   Cuff Size: Medium Adult   Pulse: 96   Temp: 97.2 °F (36.2 °C)   TempSrc: Temporal   SpO2: 94%   Weight: (!) 455 lb (206.4 kg)   Height: 5' 7.72\" (1.72 m)     Estimated body mass index is 69.76 kg/m² as calculated from the following:    Height as of this encounter: 5' 7.72\" (1.72 m). Weight as of this encounter: 455 lb (206.4 kg). Physical Exam  Constitutional:       General: He is not in acute distress. Appearance: Normal appearance. He is obese. He is not diaphoretic. HENT:      Head: Normocephalic and atraumatic. Cardiovascular:      Rate and Rhythm: Normal rate and regular rhythm. Pulses: Normal pulses. Heart sounds: Normal heart sounds. No murmur heard. No friction rub. Pulmonary:      Effort: Pulmonary effort is normal. No respiratory distress. Breath sounds: Normal breath sounds. No wheezing or rhonchi. Chest:      Chest wall: No tenderness. Abdominal:      General: Abdomen is flat. Bowel sounds are normal. There is no distension. Palpations: Abdomen is soft. Tenderness: There is no abdominal tenderness. Musculoskeletal:         General: No tenderness. Normal range of motion. Right lower leg: No edema. Left lower leg: No edema.    Neurological:      Mental Status: He is alert. No flowsheet data found. Lab Results   Component Value Date    CHOL 143 12/30/2021    CHOLFAST 142 04/24/2019    TRIG 60 12/30/2021    TRIGLYCFAST 60 04/24/2019    HDL 44 12/30/2021    HDL 42 04/24/2019    LDLCALC 87 12/30/2021    LDLCALC 88 04/24/2019    GLUCOSE 82 12/30/2021    LABA1C 5.8 12/30/2021    LABA1C 5.9 10/23/2020    LABA1C 5.8 10/09/2020       The ASCVD Risk score (Dianne Arellano, et al., 2013) failed to calculate for the following reasons: The 2013 ASCVD risk score is only valid for ages 36 to 78    Immunization History   Administered Date(s) Administered    DTaP/Hep B/IPV (Pediarix) 1990, 1990, 1990, 03/29/1991, 04/19/1993, 03/30/2001    Hepatitis B Ped/Adol (Engerix-B, Recombivax HB) 1989, 09/25/1996, 05/26/1999    Hib PRP-OMP (PedvaxHIB) 03/29/1991    MMR 08/22/1990, 12/28/1998    PPD Test 06/01/2022    Polio IPV (IPOL) 1989, 1989, 03/28/1991, 04/19/1993       Health Maintenance   Topic Date Due    COVID-19 Vaccine (1) Never done    Varicella vaccine (2 of 2 - 13+ 2-dose series) 09/16/2002    HIV screen  Never done    Hepatitis C screen  Never done    DTaP/Tdap/Td vaccine (7 - Tdap) 03/30/2011    Flu vaccine (Season Ended) 09/01/2022    A1C test (Diabetic or Prediabetic)  12/30/2022    Depression Screen  06/01/2023    Hepatitis B vaccine  Completed    Hib vaccine  Completed    Hepatitis A vaccine  Aged Out    Meningococcal (ACWY) vaccine  Aged Out    Pneumococcal 0-64 years Vaccine  Aged Out       Assessment & Plan   Encounter for pre-employment examination        -     Medical history reviewed and addressed. No new medical concerns. -     Physical evaluation performed and pre-employment paperwork completed. Noted restrictions for walking or standing for extended periods.         -     Cleared for employment given job description.  -     Mantoux testing    Elevated BP without diagnosis of hypertension        - Reports recurrent elevation in BP. 139/80 on presentation; recheck 130/80        -     Instructed on home BP monitoring with log. To be reviewed at follow up appointment. -     Blood Pressure KIT; DAILY Starting Wed 6/1/2022, Disp-1 kit, R-0, Normal    Osteoarthritis of left knee, unspecified        -     Reports progressive pain, worse with ambulation and weight bearing.        -     Tylenol 1g tid prn for pain    Class III Obesity        -      Ongoing evaluation for Bariatric Sleeve. Planned for Gastric Sleeve Surgery at [facility]. Arelis.        --Viktor Castellanos MD

## 2022-06-01 NOTE — PROGRESS NOTES
PPD Placement note  Heather Singh, 34 y.o. male is here today for placement of PPD test  Reason for PPD test: Work  Pt taken PPD test before: no  Verified in allergy area and with patient that they are not allergic to the products PPD is made of (Phenol or Tween). Yes  Is patient taking any oral or IV steroid medication now or have they taken it in the last month? no  Has the patient ever received the BCG vaccine?: no  Has the patient been in recent contact with anyone known or suspected of having active TB disease?: no       Date of exposure (if applicable): n/a       Name of person they were exposed to (if applicable): n/a  Patient's Country of origin?: n/a  O: Alert and oriented in NAD. P:  PPD placed on 6/1/2022. Patient advised to return for reading within 48-72 hours.

## 2022-06-01 NOTE — PROGRESS NOTES
Subjective:     Genie Fuentes is a 34 y.o. male who complains today of:     Chief Complaint   Patient presents with    Sleep Apnea     4 month f/u       HPI  He said he is going for gastric bypass. Need clearance   He came with his mother . He is not having any shortness of breath , no cough or wheezing . No chest pain . No h/o asthma . No smoking history    He is using CPAP with  10  centimeters of H2O with heated humidity. He is using CPAP for about 7  hours every night. He is using CPAP with  Full face  Mask. He said  sleep is restful with the CPAP use. He is compliant with CPAP therapy and benefiting with CPAP use. No snoring with CPAP use. No complaint of daytime sleepiness or tiredness with CPAP use. He denies taking naps. He denies difficulty falling asleep or staying asleep.     I reviewed compliance report with patient regarding CPAP therapy. He is using  CPAP for 29 days out of 30 days. Average usage of days used is 6 hours and 27 min , average AHI 0.8  with CPAP use. Allergies:  Patient has no known allergies. Past Medical History:   Diagnosis Date    Autism spectrum disorder     Hypogonadism male     Obesity      No past surgical history on file.   Family History   Problem Relation Age of Onset    Diabetes type 2  Mother     Hypertension Mother     Diabetes type 2  Maternal Grandmother     Heart Failure Maternal Grandmother      Social History     Socioeconomic History    Marital status: Single     Spouse name: Not on file    Number of children: Not on file    Years of education: Not on file    Highest education level: Not on file   Occupational History    Not on file   Tobacco Use    Smoking status: Never Smoker    Smokeless tobacco: Never Used   Substance and Sexual Activity    Alcohol use: Not on file    Drug use: Not on file    Sexual activity: Not on file   Other Topics Concern    Not on file   Social History Narrative    Not on file     Social Determinants of Health     Financial Resource Strain:     Difficulty of Paying Living Expenses: Not on file   Food Insecurity:     Worried About Running Out of Food in the Last Year: Not on file    Nilda of Food in the Last Year: Not on file   Transportation Needs:     Lack of Transportation (Medical): Not on file    Lack of Transportation (Non-Medical): Not on file   Physical Activity:     Days of Exercise per Week: Not on file    Minutes of Exercise per Session: Not on file   Stress:     Feeling of Stress : Not on file   Social Connections:     Frequency of Communication with Friends and Family: Not on file    Frequency of Social Gatherings with Friends and Family: Not on file    Attends [employer] Services: Not on file    Active Member of [address] [place] [address] or Organizations: Not on file    Attends Club or Organization Meetings: Not on file    Marital Status: Not on file   Intimate Partner Violence:     Fear of Current or Ex-Partner: Not on file    Emotionally Abused: Not on file    Physically Abused: Not on file    Sexually Abused: Not on file   Housing Stability:     Unable to Pay for Housing in the Last Year: Not on file    Number of [place] in the Last Year: Not on file    Unstable Housing in the Last Year: Not on file         Review of Systems   Constitutional: Negative for chills, diaphoresis, fatigue and fever. HENT: Negative for congestion, mouth sores, nosebleeds, postnasal drip, rhinorrhea, sneezing, sore throat and voice change. Eyes: Negative for itching and visual disturbance. Respiratory: Negative for cough, chest tightness, shortness of breath and wheezing. Cardiovascular: Negative. Negative for chest pain, palpitations and leg swelling. Gastrointestinal: Negative for abdominal pain, diarrhea, nausea and vomiting. Genitourinary: Negative for difficulty urinating and hematuria. Musculoskeletal: Negative for arthralgias, joint swelling and myalgias. Skin: Negative for rash. Allergic/Immunologic: Negative for environmental allergies. Neurological: Negative for dizziness, tremors, weakness and headaches. Psychiatric/Behavioral: Negative for behavioral problems and sleep disturbance.         :     Vitals:    06/01/22 0832   BP: 139/80   Pulse: 100   Temp: 98 °F (36.7 °C)   SpO2: 95%   Weight: (!) 453 lb (205.5 kg)   Height: 5' 6\" (1.676 m)     Wt Readings from Last 3 Encounters:   06/01/22 (!) 453 lb (205.5 kg)   12/29/21 (!) 470 lb (213.2 kg)   07/20/21 (!) 480 lb (217.7 kg)         Physical Exam  Constitutional:       Appearance: He is well-developed. He is obese. HENT:      Head: Normocephalic and atraumatic. Nose: Nose normal.   Eyes:      Conjunctiva/sclera: Conjunctivae normal.      Pupils: Pupils are equal, round, and reactive to light. Neck:      Thyroid: No thyromegaly. Vascular: No JVD. Trachea: No tracheal deviation. Cardiovascular:      Rate and Rhythm: Normal rate and regular rhythm. Heart sounds: No murmur heard. No friction rub. No gallop. Pulmonary:      Effort: Pulmonary effort is normal. No respiratory distress. Breath sounds: Normal breath sounds. No wheezing or rales. Chest:      Chest wall: No tenderness. Abdominal:      General: There is no distension. Musculoskeletal:         General: Normal range of motion. Lymphadenopathy:      Cervical: No cervical adenopathy. Skin:     General: Skin is warm and dry. Findings: No rash. Neurological:      Mental Status: He is alert and oriented to person, place, and time. Cranial Nerves: No cranial nerve deficit.    Psychiatric:         Behavior: Behavior normal.         Current Outpatient Medications   Medication Sig Dispense Refill    CPAP Machine MISC by Does not apply route New CPAP with 10 cm and full face mask 1 each 0    vitamin D (ERGOCALCIFEROL) 1.25 MG (54103 UT) CAPS capsule TAKE 1 CAPSULE BY MOUTH ONE TIME PER WEEK (Patient not taking: Reported on 7/20/2021) 12 capsule 1    Testosterone Cypionate 200 MG/ML SOLN Inject 1 mL as directed every 14 days for 24 doses (Patient not taking: Reported on 5/11/2021) 1 vial 3    metFORMIN (GLUCOPHAGE XR) 500 MG extended release tablet Take 1 tablet by mouth 2 times daily (before meals) May substitute for generic or covered medication (Patient not taking: Reported on 7/20/2021) 60 tablet 3     No current facility-administered medications for this visit. Assessment/Plan:     1. IGNACIO on CPAP  He is using CPAP with  10  centimeters of H2O with heated humidity. He is using CPAP for about 7  hours every night. He is using CPAP with  Full face  Mask. He said  sleep is restful with the CPAP use. He is compliant with CPAP therapy and benefiting with CPAP use. No snoring with CPAP use. continue CPAP therapy as before    I reviewed compliance report with patient regarding CPAP therapy. He is using  CPAP for 29 days out of 30 days. Average usage of days used is 6 hours and 27 min , average AHI 0.8  with CPAP use. 2. Morbid obesity ([address]  He is advised try to lose weight. obesity related risk explained to the patient ,  Current weight:  (!) 453 lb (205.5 kg) Lbs. BMI:  Body mass index is 73.12 kg/m². Suggested weight control approaches, including dietary changes , exercise, behavioral modification. He is going for bariatric surgery. 3. Preoperative respiratory examination  He is acceptable risk pulmonary wise  for bariatric surgery. Fax note to [phone] , Lisa Tracy    Return in about 3 months (around 9/1/2022) for ignacio.       Tho Mckeon MD

## 2022-06-07 ENCOUNTER — OFFICE VISIT (OUTPATIENT)
Dept: FAMILY MEDICINE CLINIC | Age: 32
End: 2022-06-07
Payer: MEDICARE

## 2022-06-07 VITALS
OXYGEN SATURATION: 98 % | SYSTOLIC BLOOD PRESSURE: 140 MMHG | TEMPERATURE: 95.4 F | HEART RATE: 71 BPM | DIASTOLIC BLOOD PRESSURE: 80 MMHG | HEIGHT: 68 IN | BODY MASS INDEX: 47.74 KG/M2 | WEIGHT: 315 LBS

## 2022-06-07 DIAGNOSIS — B37.0 CANDIDA INFECTION OF MOUTH: Primary | ICD-10-CM

## 2022-06-07 PROCEDURE — 99212 OFFICE O/P EST SF 10 MIN: CPT | Performed by: NURSE PRACTITIONER

## 2022-06-07 RX ORDER — FLUCONAZOLE 150 MG/1
150 TABLET ORAL DAILY
Qty: 3 TABLET | Refills: 0 | Status: SHIPPED | OUTPATIENT
Start: 2022-06-07 | End: 2022-06-10

## 2022-06-07 NOTE — PATIENT INSTRUCTIONS
Patient Education        Candidiasis: Care Instructions  Your Care Instructions  Candidiasis (say \"pjb-mkz-GJ-uh-osmany\") is a yeast infection. Yeast normally lives in your body. But it can cause problems if your body's defenses don'twork as they should. Some medicines can increase your chance of getting a yeast infection. These include antibiotics, steroids, and cancer drugs. And some diseases like AIDSand diabetes can make you more likely to get yeast infections. There are different types of yeast infections. Joshua Jang is a yeast infection in the mouth. It usually occurs in people with weakimmune systems. It causes white patches inside the mouth and throat. Yeast infections of the skin usually occur in skin folds where the skin stays moist. They cause red, oozing patches on your skin. Babies can get these infections under the diaper. Peoplewho often wear gloves can get them on their hands. Many women get vaginal yeast infections. They are most common when women take antibiotics. These infections can cause the vagina to itch and burn. They also cause white discharge that looks likecottage cheese. In rare cases, yeast infects the blood. This can cause serious disease. This kind of infection is treated with medicine given through a needle into a vein(IV). After you start treatment, a yeast infection usually goes away quickly. But if your immune system is weak, the infection may come back. Tell your doctor ifyou get yeast infections often. Follow-up care is a key part of your treatment and safety. Be sure to make and go to all appointments, and call your doctor if you are having problems. It's also a good idea to know your test results and keep alist of the medicines you take. How can you care for yourself at home?  Take your medicines exactly as prescribed. Call your doctor if you think you are having a problem with your medicine.  Use antibiotics only as directed by your doctor.    Eat yogurt with live cultures. It has bacteria called lactobacillus. It may help prevent some types of yeast infections.  Keep your skin clean and dry. Put powder on moist places.  If you are using a cream or suppository to treat a vaginal yeast infection, don't use condoms or a diaphragm. Use a different type of birth control.  Eat a healthy diet and get regular exercise. This will help keep your immune system strong. When should you call for help? Watch closely for changes in your health, and be sure to contact your doctor if:     You do not get better as expected. Where can you learn more? Go to [email or web address] and sign in to your [employer] account. Enter Z836 in the Ayasdi box to learn more about \"Candidiasis: Care Instructions. \"     If you do not have an account, please click on the \"Sign Up Now\" link. Current as of: November 22, 2021               Content Version: 13.2  © 2006-2022 Healthwise, [facility]. Care instructions adapted under license by [facility] ([facility]). If you have questions about a medical condition or this instruction, always ask your healthcare professional. [place] [address] any warranty or liability for your use of this information.

## 2022-06-07 NOTE — PROGRESS NOTES
Subjective:      Patient ID: Beata Cerda is a 34 y.o. male who presents today for:  Chief Complaint   Patient presents with    Allergic Reaction     patient mother believes that he had an allergic reaction to medications, tongue is yellow, x2days after medication was completed tx: n/a       HPI patient presents with oral thrush. HPI provided by mother. States that patient was recently on a few different antibiotics for the treatment of H. pylori and now has yellowish film on tongue. Tongue is significantly coated, pharynx is erythematous but no exaggerated noted. Past Medical History:   Diagnosis Date    Autism spectrum disorder     Hypogonadism male     Obesity      No past surgical history on file. Social History     Socioeconomic History    Marital status: Single     Spouse name: Not on file    Number of children: Not on file    Years of education: Not on file    Highest education level: Not on file   Occupational History    Not on file   Tobacco Use    Smoking status: Never Smoker    Smokeless tobacco: Never Used   Substance and Sexual Activity    Alcohol use: Not on file    Drug use: Not on file    Sexual activity: Not on file   Other Topics Concern    Not on file   Social History Narrative    Not on file     Social Determinants of Health     Financial Resource Strain: Low Risk     Difficulty of Paying Living Expenses: Not very hard   Food Insecurity: No Food Insecurity    Worried About Running Out of Food in the Last Year: Never true    Nilda of Food in the Last Year: Never true   Transportation Needs:     Lack of Transportation (Medical): Not on file    Lack of Transportation (Non-Medical):  Not on file   Physical Activity:     Days of Exercise per Week: Not on file    Minutes of Exercise per Session: Not on file   Stress:     Feeling of Stress : Not on file   Social Connections:     Frequency of Communication with Friends and Family: Not on file    Frequency of Social Gatherings with Friends and Family: Not on file    Attends [employer] Services: Not on file    Active Member of Clubs or Organizations: Not on file    Attends Club or Organization Meetings: Not on file    Marital Status: Not on file   Intimate Partner Violence:     Fear of Current or Ex-Partner: Not on file    Emotionally Abused: Not on file    Physically Abused: Not on file    Sexually Abused: Not on file   Housing Stability:     Unable to Pay for Housing in the Last Year: Not on file    Number of [place] in the Last Year: Not on file    Unstable Housing in the Last Year: Not on file     Family History   Problem Relation Age of Onset    Diabetes type 2  Mother     Hypertension Mother     Diabetes type 2  Maternal Grandmother     Heart Failure Maternal Grandmother      No Known Allergies  Current Outpatient Medications   Medication Sig Dispense Refill    nystatin (MYCOSTATIN) 449864 UNIT/ML suspension Take 5 mLs by mouth 4 times daily for 14 days Swish and swallow over 20 minutes.  280 mL 0    fluconazole (DIFLUCAN) 150 MG tablet Take 1 tablet by mouth daily for 3 days 3 tablet 0    Magic Mouthwash (MIRACLE MOUTHWASH) Swish and spit 5 mLs 4 times daily as needed for Irritation 300 mL 0    Blood Pressure KIT 1 Device by Does not apply route daily 1 kit 0    CPAP Machine MISC by Does not apply route New CPAP with 10 cm and full face mask 1 each 0    [employer] STOMACH RELIEF 262 MG chewable tablet TAKE 1 TABLET BY MOUTH FOUR TIMES A DAY (Patient not taking: Reported on 6/7/2022)      metroNIDAZOLE (FLAGYL) 375 MG capsule TAKE 1 CAPSULE BY MOUTH FOUR TIMES A DAY (Patient not taking: Reported on 6/7/2022)      tetracycline (ACHROMYCIN;SUMYCIN) 500 MG capsule TAKE 1 CAPSULE BY MOUTH FOUR TIMES A DAY (Patient not taking: Reported on 6/7/2022)      pantoprazole (PROTONIX) 40 MG tablet TAKE 1 TABLET BY MOUTH TWICE A DAY (Patient not taking: Reported on 6/7/2022)      Testosterone Cypionate 200 MG/ML SOLN Inject 1 mL as directed every 14 days for 24 doses (Patient not taking: Reported on 5/11/2021) 1 vial 3     No current facility-administered medications for this visit. Review of Systems   HENT:        Oral mucosa. With yellow film, also complains that mouth is irritated and sore. All other systems reviewed and are negative. Objective:   BP (!) 140/80   Pulse 71   Temp (!) 95.4 °F (35.2 °C) (Temporal)   Ht 5' 7.5\" (1.715 m) Comment: per pt  Wt (!) 455 lb (206.4 kg) Comment: per last visit  SpO2 98%   BMI 70.21 kg/m²     Physical Exam  Constitutional:       General: He is not in acute distress. Appearance: He is well-developed. HENT:      Head: Normocephalic and atraumatic. Nose: Nose normal.      Mouth/Throat:      Pharynx: Posterior oropharyngeal erythema present. Comments: Oral mucosa coated with candidiasis infection  Eyes:      Pupils: Pupils are equal, round, and reactive to light. Cardiovascular:      Rate and Rhythm: Normal rate and regular rhythm. Pulses: Normal pulses. Heart sounds: Normal heart sounds. Pulmonary:      Effort: Pulmonary effort is normal.      Breath sounds: Normal breath sounds. Abdominal:      General: Bowel sounds are normal.      Palpations: Abdomen is soft. Musculoskeletal:         General: Normal range of motion. Cervical back: Normal range of motion and neck supple. Skin:     General: Skin is warm and dry. Capillary Refill: Capillary refill takes less than 2 seconds. Neurological:      General: No focal deficit present. Mental Status: He is alert and oriented to person, place, and time. Psychiatric:         Behavior: Behavior normal.         Assessment:       Diagnosis Orders   1. Candida infection of mouth  nystatin (MYCOSTATIN) 064734 UNIT/ML suspension    Magic Mouthwash (MIRACLE MOUTHWASH)     No results found for this visit on 06/07/22.    Plan:     Assessment & Plan   Debi Luna was seen today for allergic reaction. Diagnoses and all orders for this visit:    Candida infection of mouth  -     nystatin (MYCOSTATIN) 232388 UNIT/ML suspension; Take 5 mLs by mouth 4 times daily for 14 days Swish and swallow over 20 minutes. -     Magic Mouthwash (MIRACLE MOUTHWASH); Swish and spit 5 mLs 4 times daily as needed for Irritation    Other orders  -     fluconazole (DIFLUCAN) 150 MG tablet; Take 1 tablet by mouth daily for 3 days      No orders of the defined types were placed in this encounter. Orders Placed This Encounter   Medications    nystatin (MYCOSTATIN) 124255 UNIT/ML suspension     Sig: Take 5 mLs by mouth 4 times daily for 14 days Swish and swallow over 20 minutes. Dispense:  280 mL     Refill:  0    fluconazole (DIFLUCAN) 150 MG tablet     Sig: Take 1 tablet by mouth daily for 3 days     Dispense:  3 tablet     Refill:  0    Magic Mouthwash (MIRACLE MOUTHWASH)     Sig: Swish and spit 5 mLs 4 times daily as needed for Irritation     Dispense:  300 mL     Refill:  0     There are no discontinued medications. No follow-ups on file. Reviewed with the patient/family: current clinical status & medications. Side effects of the medication prescribed today, as well as treatment plan/rationale and result expectations have been discussed with the patient/family who expresses understanding. Patient will be discharged home in stable condition. Follow up with PCP to evaluate treatment results or return if symptoms worsen or fail to improve. Discussed signs and symptoms which require immediate follow-up in ED/call to 911. Understanding verbalized. I have reviewed the patient's medical history in detail and updated the computerized patient record.     Mick Key, GEN - CNP

## 2022-09-15 ENCOUNTER — OFFICE VISIT (OUTPATIENT)
Dept: PULMONOLOGY | Age: 32
End: 2022-09-15
Payer: MEDICARE

## 2022-09-15 VITALS
BODY MASS INDEX: 68.67 KG/M2 | DIASTOLIC BLOOD PRESSURE: 78 MMHG | SYSTOLIC BLOOD PRESSURE: 136 MMHG | HEART RATE: 70 BPM | OXYGEN SATURATION: 96 % | WEIGHT: 315 LBS

## 2022-09-15 DIAGNOSIS — G47.33 OSA ON CPAP: Primary | ICD-10-CM

## 2022-09-15 DIAGNOSIS — E66.01 MORBID OBESITY (HCC): ICD-10-CM

## 2022-09-15 DIAGNOSIS — Z99.89 OSA ON CPAP: Primary | ICD-10-CM

## 2022-09-15 PROCEDURE — 99214 OFFICE O/P EST MOD 30 MIN: CPT | Performed by: INTERNAL MEDICINE

## 2022-09-15 ASSESSMENT — ENCOUNTER SYMPTOMS
NAUSEA: 0
DIARRHEA: 0
WHEEZING: 0
CHEST TIGHTNESS: 0
SHORTNESS OF BREATH: 0
EYE ITCHING: 0
VOMITING: 0
COUGH: 0
RHINORRHEA: 0
ABDOMINAL PAIN: 0
VOICE CHANGE: 0
SORE THROAT: 0

## 2022-09-15 NOTE — PROGRESS NOTES
Subjective:     Diana Goncalves is a 34 y.o. male who complains today of:     Chief Complaint   Patient presents with    Sleep Apnea    Follow-up     Discuss weight loss program with Anne (Brianna August)       HPI  He said he is not  going for gastric bypass at [address] any more and they want to have it done. [facility]. He came with his mother . He is not having any shortness of breath , no cough or wheezing . No chest pain . No h/o asthma . No smoking history     He is using CPAP with  10  centimeters of H2O with heated humidity. He is using CPAP for about 5 hours every night. He is using CPAP with  Full face  Mask. He said  sleep is restful with the CPAP use. He is compliant with CPAP therapy and benefiting with CPAP use. No snoring with CPAP use. No complaint of daytime sleepiness or tiredness with CPAP use. He denies taking naps. He denies difficulty falling asleep or staying asleep. I reviewed compliance report with patient regarding CPAP therapy. He is using CPAP for 29 days out of 30 days. Average usage of days used is 4 hours and 55  min , average AHI 0.6  with CPAP use. Allergies:  Patient has no known allergies. Past Medical History:   Diagnosis Date    Autism spectrum disorder     Hypogonadism male     Obesity      No past surgical history on file.   Family History   Problem Relation Age of Onset    Diabetes type 2  Mother     Hypertension Mother     Diabetes type 2  Maternal Grandmother     Heart Failure Maternal Grandmother      Social History     Socioeconomic History    Marital status: Single     Spouse name: Not on file    Number of children: Not on file    Years of education: Not on file    Highest education level: Not on file   Occupational History    Not on file   Tobacco Use    Smoking status: Never    Smokeless tobacco: Never   Substance and Sexual Activity    Alcohol use: Not on file    Drug use: Not on file    Sexual activity: Not on file   Other Topics Concern    Not on file   Social History Narrative    Not on file     Social Determinants of Health     Financial Resource Strain: Low Risk     Difficulty of Paying Living Expenses: Not very hard   Food Insecurity: No Food Insecurity    Worried About Running Out of Food in the Last Year: Never true    Ran Out of Food in the Last Year: Never true   Transportation Needs: Not on file   Physical Activity: Not on file   Stress: Not on file   Social Connections: Not on file   Intimate Partner Violence: Not on file   Housing Stability: Not on file         Review of Systems   Constitutional:  Negative for chills, diaphoresis, fatigue and fever. HENT:  Negative for congestion, mouth sores, nosebleeds, postnasal drip, rhinorrhea, sneezing, sore throat and voice change. Eyes:  Negative for itching and visual disturbance. Respiratory:  Negative for cough, chest tightness, shortness of breath and wheezing. Cardiovascular: Negative. Negative for chest pain, palpitations and leg swelling. Gastrointestinal:  Negative for abdominal pain, diarrhea, nausea and vomiting. Genitourinary:  Negative for difficulty urinating and hematuria. Musculoskeletal:  Negative for arthralgias, joint swelling and myalgias. Skin:  Negative for rash. Allergic/Immunologic: Negative for environmental allergies. Neurological:  Negative for dizziness, tremors, weakness and headaches. Psychiatric/Behavioral:  Positive for sleep disturbance. Negative for behavioral problems. :     Vitals:    09/15/22 1520   BP: 136/78   Site: Right Upper Arm   Position: Sitting   Cuff Size: Large Adult   Pulse: 70   SpO2: 96%   Weight: (!) 445 lb (201.9 kg)     Wt Readings from Last 3 Encounters:   09/15/22 (!) 445 lb (201.9 kg)   06/07/22 (!) 455 lb (206.4 kg)   06/01/22 (!) 455 lb (206.4 kg)         Physical Exam  Constitutional:       Appearance: He is well-developed. HENT:      Head: Normocephalic and atraumatic.       Nose: Nose normal. Eyes:      Conjunctiva/sclera: Conjunctivae normal.      Pupils: Pupils are equal, round, and reactive to light. Neck:      Thyroid: No thyromegaly. Vascular: No JVD. Trachea: No tracheal deviation. Cardiovascular:      Rate and Rhythm: Normal rate and regular rhythm. Heart sounds: No murmur heard. No friction rub. No gallop. Pulmonary:      Effort: Pulmonary effort is normal. No respiratory distress. Breath sounds: Normal breath sounds. No wheezing or rales. Chest:      Chest wall: No tenderness. Abdominal:      General: There is no distension. Musculoskeletal:         General: Normal range of motion. Lymphadenopathy:      Cervical: No cervical adenopathy. Skin:     General: Skin is warm and dry. Findings: No rash. Neurological:      Mental Status: He is alert and oriented to person, place, and time. Cranial Nerves: No cranial nerve deficit. Psychiatric:         Behavior: Behavior normal.       Current Outpatient Medications   Medication Sig Dispense Refill    Blood Pressure KIT 1 Device by Does not apply route daily 1 kit 0    CPAP Machine MISC by Does not apply route New CPAP with 10 cm and full face mask 1 each 0     No current facility-administered medications for this visit. Assessment/Plan:     1. IGNACIO on CPAP    He is using CPAP with  10  centimeters of H2O with heated humidity. He is using CPAP for about 5 hours every night. He is using CPAP with  Full face  Mask. He said  sleep is restful with the CPAP use. He is compliant with CPAP therapy and benefiting with CPAP use. No snoring with CPAP use. I reviewed compliance report with patient regarding CPAP therapy. He is using CPAP for 29 days out of 30 days. Average usage of days used is 4 hours and 55  min , average AHI 0.6  with CPAP use. 2. Morbid obesity ([address]  He is advised try to lose weight. obesity related risk explained to the patient ,  Current weight:  (!) 445 lb (201.9 kg) Lbs.  BMI: Body mass index is 68.67 kg/m². Suggested weight control approaches, including dietary changes , exercise, behavioral modification.    - Robin Alfredo., MD, [place]    Return in about 3 months (around 12/15/2022) for jovan.       Edgardo Pardo MD

## 2023-02-07 DIAGNOSIS — E88.81 INSULIN RESISTANCE: ICD-10-CM

## 2023-02-07 DIAGNOSIS — E66.01 OBESITIES, MORBID (HCC): Primary | ICD-10-CM

## 2023-02-07 DIAGNOSIS — E29.1 HYPOGONADISM MALE: ICD-10-CM

## 2023-02-07 DIAGNOSIS — G47.33 OSA (OBSTRUCTIVE SLEEP APNEA): ICD-10-CM

## 2023-07-07 ENCOUNTER — OFFICE VISIT (OUTPATIENT)
Dept: BARIATRICS/WEIGHT MGMT | Age: 33
End: 2023-07-07
Payer: MEDICARE

## 2023-07-07 VITALS
SYSTOLIC BLOOD PRESSURE: 143 MMHG | BODY MASS INDEX: 49.44 KG/M2 | HEART RATE: 87 BPM | WEIGHT: 315 LBS | HEIGHT: 67 IN | OXYGEN SATURATION: 98 % | DIASTOLIC BLOOD PRESSURE: 82 MMHG

## 2023-07-07 DIAGNOSIS — E66.01 MORBID OBESITY (HCC): Primary | ICD-10-CM

## 2023-07-07 DIAGNOSIS — F84.0 AUTISM: ICD-10-CM

## 2023-07-07 DIAGNOSIS — E88.81 INSULIN RESISTANCE: ICD-10-CM

## 2023-07-07 PROCEDURE — 99205 OFFICE O/P NEW HI 60 MIN: CPT | Performed by: SURGERY

## 2023-09-22 ENCOUNTER — HOSPITAL ENCOUNTER (OUTPATIENT)
Dept: GENERAL RADIOLOGY | Age: 33
End: 2023-09-22
Payer: MEDICARE

## 2023-09-22 ENCOUNTER — OFFICE VISIT (OUTPATIENT)
Dept: FAMILY MEDICINE CLINIC | Age: 33
End: 2023-09-22

## 2023-09-22 VITALS
OXYGEN SATURATION: 93 % | SYSTOLIC BLOOD PRESSURE: 148 MMHG | BODY MASS INDEX: 49.44 KG/M2 | WEIGHT: 315 LBS | HEIGHT: 67 IN | HEART RATE: 78 BPM | DIASTOLIC BLOOD PRESSURE: 100 MMHG | RESPIRATION RATE: 16 BRPM

## 2023-09-22 DIAGNOSIS — G47.33 OSA (OBSTRUCTIVE SLEEP APNEA): ICD-10-CM

## 2023-09-22 DIAGNOSIS — E66.01 OBESITIES, MORBID (HCC): Primary | ICD-10-CM

## 2023-09-22 DIAGNOSIS — E66.01 OBESITIES, MORBID (HCC): ICD-10-CM

## 2023-09-22 DIAGNOSIS — G89.29 CHRONIC PAIN OF BOTH KNEES: ICD-10-CM

## 2023-09-22 DIAGNOSIS — M25.562 ACUTE PAIN OF LEFT KNEE: ICD-10-CM

## 2023-09-22 DIAGNOSIS — R73.03 PREDIABETES: ICD-10-CM

## 2023-09-22 DIAGNOSIS — E88.81 INSULIN RESISTANCE: ICD-10-CM

## 2023-09-22 DIAGNOSIS — M25.561 CHRONIC PAIN OF BOTH KNEES: ICD-10-CM

## 2023-09-22 DIAGNOSIS — R03.0 ELEVATED BLOOD PRESSURE READING: ICD-10-CM

## 2023-09-22 DIAGNOSIS — M25.562 CHRONIC PAIN OF BOTH KNEES: ICD-10-CM

## 2023-09-22 DIAGNOSIS — E29.1 HYPOGONADISM MALE: ICD-10-CM

## 2023-09-22 LAB
ALBUMIN SERPL-MCNC: 4.3 G/DL (ref 3.5–4.6)
ALP SERPL-CCNC: 85 U/L (ref 35–104)
ALT SERPL-CCNC: 23 U/L (ref 0–41)
ANION GAP SERPL CALCULATED.3IONS-SCNC: 12 MEQ/L (ref 9–15)
AST SERPL-CCNC: 19 U/L (ref 0–40)
BILIRUB SERPL-MCNC: 0.3 MG/DL (ref 0.2–0.7)
BUN SERPL-MCNC: 11 MG/DL (ref 6–20)
CALCIUM SERPL-MCNC: 9.7 MG/DL (ref 8.5–9.9)
CHLORIDE SERPL-SCNC: 101 MEQ/L (ref 95–107)
CO2 SERPL-SCNC: 28 MEQ/L (ref 20–31)
CREAT SERPL-MCNC: 1.08 MG/DL (ref 0.7–1.2)
ERYTHROCYTE [DISTWIDTH] IN BLOOD BY AUTOMATED COUNT: 14.4 % (ref 11.5–14.5)
GLOBULIN SER CALC-MCNC: 4.1 G/DL (ref 2.3–3.5)
GLUCOSE SERPL-MCNC: 84 MG/DL (ref 70–99)
HBA1C MFR BLD: 6 % (ref 4.8–5.9)
HCT VFR BLD AUTO: 45.4 % (ref 42–52)
HGB BLD-MCNC: 14.2 G/DL (ref 14–18)
MCH RBC QN AUTO: 26 PG (ref 27–31.3)
MCHC RBC AUTO-ENTMCNC: 31.3 % (ref 33–37)
MCV RBC AUTO: 83.2 FL (ref 79–92.2)
PLATELET # BLD AUTO: 267 K/UL (ref 130–400)
POTASSIUM SERPL-SCNC: 4.2 MEQ/L (ref 3.4–4.9)
PROT SERPL-MCNC: 8.4 G/DL (ref 6.3–8)
RBC # BLD AUTO: 5.46 M/UL (ref 4.7–6.1)
SHBG SERPL-SCNC: 25 NMOL/L (ref 11–80)
SODIUM SERPL-SCNC: 141 MEQ/L (ref 135–144)
T4 FREE SERPL-MCNC: 1.11 NG/DL (ref 0.84–1.68)
TESTOST FREE SERPL-MCNC: 39.2 PG/ML (ref 47–244)
TESTOST SERPL-MCNC: 178 NG/DL (ref 220–1000)
TSH SERPL-MCNC: 2.01 UIU/ML (ref 0.44–3.86)
VITAMIN D 25-HYDROXY: 27.5 NG/ML
WBC # BLD AUTO: 5.3 K/UL (ref 4.8–10.8)

## 2023-09-22 PROCEDURE — 73560 X-RAY EXAM OF KNEE 1 OR 2: CPT

## 2023-09-22 RX ORDER — AMLODIPINE AND VALSARTAN 10; 160 MG/1; MG/1
1 TABLET ORAL DAILY
Qty: 30 TABLET | Refills: 3 | Status: SHIPPED | OUTPATIENT
Start: 2023-09-22

## 2023-09-22 ASSESSMENT — ENCOUNTER SYMPTOMS
BACK PAIN: 0
CHEST TIGHTNESS: 0
VOMITING: 0
SHORTNESS OF BREATH: 1
NAUSEA: 0
DIARRHEA: 0
FACIAL SWELLING: 0
EYE REDNESS: 0
CONSTIPATION: 0
ABDOMINAL PAIN: 0
EYE ITCHING: 0
EYE DISCHARGE: 0
EYE PAIN: 0
COUGH: 0
TROUBLE SWALLOWING: 0

## 2023-09-22 NOTE — PROGRESS NOTES
Subjective  David Cho, 28 y.o. male presents today with:  Chief Complaint   Patient presents with    Knee Pain     Right knee pain has been ongoing x2 months, states it feels swollen. States he went on a walk when this started hurting        HPI  In the office today for follow up. Last OV with me: 10/9/2020. Would like second opinion referral to [facility] bariatric surgery. Had consult with Dr. David Rubio. Mom would like to take him elsewhere for consult. Has been exercising, trying to lose weight. Noted to have elevated blood pressure on multiple occasions. Trying to make lifestyle changes/weight loss. They are open and OK with starting medication. Denies CP, dizziness, HA, vision changes. He is using his CPAP machine nightly for IGNACIO. Chronic knee pain. Bilateral knee pain, R>>L. Knees will feel stiff, swollen. Not taking anything for pain. Due for labs. Review of Systems   Constitutional:  Positive for activity change and fatigue. Negative for appetite change, chills, diaphoresis, fever and unexpected weight change. HENT:  Negative for congestion, facial swelling, mouth sores and trouble swallowing. Eyes:  Negative for pain, discharge, redness, itching and visual disturbance. Respiratory:  Positive for shortness of breath (with exertion). Negative for cough and chest tightness. Cardiovascular:  Negative for chest pain, palpitations and leg swelling. Gastrointestinal:  Negative for abdominal pain, constipation, diarrhea, nausea and vomiting. Endocrine: Negative for polydipsia, polyphagia and polyuria. Genitourinary:  Negative for difficulty urinating. Musculoskeletal:  Positive for arthralgias (L knee pain), gait problem and joint swelling. Negative for back pain and myalgias. Skin:  Negative for pallor, rash and wound. Neurological:  Negative for dizziness, tremors, facial asymmetry, light-headedness and headaches.    Psychiatric/Behavioral:  Negative for

## 2023-09-23 LAB
FOLATE: 3.8 NG/ML (ref 4.8–24.2)
VITAMIN B-12: 864 PG/ML (ref 232–1245)

## 2023-09-23 RX ORDER — MULTIVITAMIN WITH IRON
1 TABLET ORAL DAILY
Qty: 90 TABLET | Refills: 4 | Status: SHIPPED | OUTPATIENT
Start: 2023-09-23

## 2023-09-24 LAB
INSULIN FREE SERPL-ACNC: 10 UIU/ML (ref 3–25)
INSULIN SERPL-ACNC: 15 UIU/ML (ref 3–25)

## 2023-09-25 ENCOUNTER — CARE COORDINATION (OUTPATIENT)
Dept: CARE COORDINATION | Age: 33
End: 2023-09-25

## 2023-09-25 LAB — VIT B1 SERPL-MCNC: 7 NMOL/L (ref 4–15)

## 2023-09-25 NOTE — CARE COORDINATION
Case referred to this writer by SUNNI Mcintoshphone call with Anuja/mother. Patient in need of bariatric surgery at [facility]. Patient wants to stay with [facility]. Patient has [facility] will no longer be accepting 10/1/2023. Keo Nova is looking for insurance that will take both [facility] and [facility]. Provided Keo Nova with contact information for [employer]. Keo Nova expressed plan to follow up with this writer once she speaks with Pj Vega.

## 2023-09-26 RX ORDER — NAPROXEN 500 MG/1
500 TABLET ORAL 2 TIMES DAILY WITH MEALS
Qty: 180 TABLET | Refills: 1 | Status: SHIPPED | OUTPATIENT
Start: 2023-09-26

## 2023-09-29 ENCOUNTER — CARE COORDINATION (OUTPATIENT)
Dept: CARE COORDINATION | Age: 33
End: 2023-09-29

## 2023-09-29 NOTE — CARE COORDINATION
Telephone call to Nereyda Valladares 5/4/1967/Mother. [facility] physicians/hospital will be in network. She was appreciative of this information. She had not made any changes with patient's insurance. She stated that she is recently unemployed and is looking for employment. She applied for food stamps and her  Lia Atkins stated that she needed a letter from her PCP stating that she is needed at home to care for patient. Letter needs to be faxed to Lia Atkins at [phone] Case number is [id number]. Discussed would send [facility] and in basket message asking for this letter. While Karson Mathew was on the phone sent [facility] and [facility] staff an in basket message asking for this letter. Also provided Karson Priyanka with [employer] to find food distributions in her area. Karson Mathew stated that she does have insurance/[employer].

## 2023-10-06 ENCOUNTER — CARE COORDINATION (OUTPATIENT)
Dept: CARE COORDINATION | Age: 33
End: 2023-10-06

## 2023-10-06 NOTE — CARE COORDINATION
Telephone call to Anuja/mother. Left voicemail of reason for call with request for return phone call. Call back number was provided.

## 2023-10-13 ENCOUNTER — CARE COORDINATION (OUTPATIENT)
Dept: CARE COORDINATION | Age: 33
End: 2023-10-13

## 2023-10-20 ENCOUNTER — CARE COORDINATION (OUTPATIENT)
Dept: CARE COORDINATION | Age: 33
End: 2023-10-20

## 2023-10-20 NOTE — CARE COORDINATION
Telephone to Anuja/mother. Left voicemail of reason for call with request for return phone call.   Call back number was provided,

## 2023-11-03 ENCOUNTER — CARE COORDINATION (OUTPATIENT)
Dept: CARE COORDINATION | Age: 33
End: 2023-11-03

## 2023-11-03 NOTE — CARE COORDINATION
Telephone call with Anuja/mother. Provided patient with [employer] for food distributions. She is considering going back to college. She feels her needs are educational. Snd wanting to get a job. She is still looking with housing. No problems affording rent, utilities , food and medications.

## 2023-11-17 ENCOUNTER — CARE COORDINATION (OUTPATIENT)
Dept: CARE COORDINATION | Age: 33
End: 2023-11-17

## 2023-12-01 ENCOUNTER — CARE COORDINATION (OUTPATIENT)
Dept: CARE COORDINATION | Age: 33
End: 2023-12-01

## 2023-12-08 ENCOUNTER — CARE COORDINATION (OUTPATIENT)
Dept: CARE COORDINATION | Age: 33
End: 2023-12-08

## 2023-12-15 ENCOUNTER — CARE COORDINATION (OUTPATIENT)
Dept: CARE COORDINATION | Age: 33
End: 2023-12-15

## 2023-12-15 NOTE — CARE COORDINATION
Telephone call with Anuja/mother. She stated that she is working on returning to college in January to work on her associates degree. No problems affording rent, utilities,  food or medications. .  She went to [facility] for food distribution. Discussed that [facility] does have diabetes education and provided contact name of Sapna Oreilly.

## 2024-01-04 ENCOUNTER — CARE COORDINATION (OUTPATIENT)
Dept: CARE COORDINATION | Age: 34
End: 2024-01-04

## 2024-01-04 NOTE — CARE COORDINATION
Telephone call to Anuja/mother .  Left voicemail of reason for call with request for return phone call.  Call back number was provided.

## 2024-01-10 ENCOUNTER — CARE COORDINATION (OUTPATIENT)
Dept: CARE COORDINATION | Age: 34
End: 2024-01-10

## 2024-01-15 DIAGNOSIS — R03.0 ELEVATED BLOOD PRESSURE READING: ICD-10-CM

## 2024-01-16 ENCOUNTER — CARE COORDINATION (OUTPATIENT)
Dept: CARE COORDINATION | Age: 34
End: 2024-01-16

## 2024-01-16 NOTE — CARE COORDINATION
Telephone call to Anuja/mother.  Left voicemail of purpose of call with request for return phone call.  Call back number was provided.

## 2024-01-17 NOTE — TELEPHONE ENCOUNTER
Comments:     Last Office Visit (last PCP visit):   9/22/2023    Next Visit Date:  No future appointments.    **If hasn't been seen in over a year OR hasn't followed up according to last diabetes/ADHD visit, make appointment for patient before sending refill to provider.    Rx requested:  Requested Prescriptions     Pending Prescriptions Disp Refills    amLODIPine-valsartan (EXFORGE)  MG per tablet [Pharmacy Med Name: AMLODIPINE-VALSARTAN  MG] 90 tablet 1     Sig: TAKE 1 TABLET BY MOUTH EVERY DAY                   
nutrition assessment for length of stay

## 2024-01-19 RX ORDER — AMLODIPINE AND VALSARTAN 10; 160 MG/1; MG/1
1 TABLET ORAL DAILY
Qty: 90 TABLET | Refills: 1 | Status: SHIPPED | OUTPATIENT
Start: 2024-01-19

## 2024-01-23 ENCOUNTER — CARE COORDINATION (OUTPATIENT)
Dept: CARE COORDINATION | Age: 34
End: 2024-01-23

## 2024-01-23 NOTE — CARE COORDINATION
Telephone call to Anuja//mother.  Left voicemail explaining this would be final call as unable to reach her.  Provided call back number for future reference. Will discharge patient from [facility].  
yes

## 2024-10-29 ENCOUNTER — OFFICE VISIT (OUTPATIENT)
Dept: FAMILY MEDICINE CLINIC | Age: 34
End: 2024-10-29

## 2024-10-29 VITALS
WEIGHT: 315 LBS | RESPIRATION RATE: 16 BRPM | OXYGEN SATURATION: 94 % | DIASTOLIC BLOOD PRESSURE: 78 MMHG | BODY MASS INDEX: 49.44 KG/M2 | HEART RATE: 77 BPM | SYSTOLIC BLOOD PRESSURE: 122 MMHG | HEIGHT: 67 IN

## 2024-10-29 DIAGNOSIS — E29.1 HYPOGONADISM MALE: ICD-10-CM

## 2024-10-29 DIAGNOSIS — F84.0 AUTISM SPECTRUM DISORDER: ICD-10-CM

## 2024-10-29 DIAGNOSIS — R63.5 WEIGHT GAIN: ICD-10-CM

## 2024-10-29 DIAGNOSIS — E53.8 FOLATE DEFICIENCY: ICD-10-CM

## 2024-10-29 DIAGNOSIS — R73.03 PREDIABETES: Primary | ICD-10-CM

## 2024-10-29 DIAGNOSIS — E55.9 VITAMIN D DEFICIENCY: ICD-10-CM

## 2024-10-29 DIAGNOSIS — R73.03 PREDIABETES: ICD-10-CM

## 2024-10-29 DIAGNOSIS — F50.812 SEVERE BINGE-EATING DISORDER: ICD-10-CM

## 2024-10-29 DIAGNOSIS — G47.33 OSA (OBSTRUCTIVE SLEEP APNEA): ICD-10-CM

## 2024-10-29 PROCEDURE — 99214 OFFICE O/P EST MOD 30 MIN: CPT | Performed by: PHYSICIAN ASSISTANT

## 2024-10-29 RX ORDER — LISDEXAMFETAMINE DIMESYLATE 70 MG/1
70 CAPSULE ORAL DAILY
Qty: 30 CAPSULE | Refills: 0 | Status: SHIPPED | OUTPATIENT
Start: 2024-10-29 | End: 2024-11-28

## 2024-10-29 SDOH — ECONOMIC STABILITY: FOOD INSECURITY: WITHIN THE PAST 12 MONTHS, YOU WORRIED THAT YOUR FOOD WOULD RUN OUT BEFORE YOU GOT MONEY TO BUY MORE.: NEVER TRUE

## 2024-10-29 SDOH — ECONOMIC STABILITY: INCOME INSECURITY: HOW HARD IS IT FOR YOU TO PAY FOR THE VERY BASICS LIKE FOOD, HOUSING, MEDICAL CARE, AND HEATING?: NOT HARD AT ALL

## 2024-10-29 SDOH — ECONOMIC STABILITY: FOOD INSECURITY: WITHIN THE PAST 12 MONTHS, THE FOOD YOU BOUGHT JUST DIDN'T LAST AND YOU DIDN'T HAVE MONEY TO GET MORE.: NEVER TRUE

## 2024-10-29 ASSESSMENT — ENCOUNTER SYMPTOMS
BACK PAIN: 0
NAUSEA: 0
EYE REDNESS: 0
ABDOMINAL PAIN: 0
EYE PAIN: 0
EYE ITCHING: 0
FACIAL SWELLING: 0
SHORTNESS OF BREATH: 1
EYE DISCHARGE: 0
COUGH: 0
DIARRHEA: 0
CONSTIPATION: 0
TROUBLE SWALLOWING: 0
VOMITING: 0
CHEST TIGHTNESS: 0

## 2024-10-29 ASSESSMENT — PATIENT HEALTH QUESTIONNAIRE - PHQ9: DEPRESSION UNABLE TO ASSESS: FUNCTIONAL CAPACITY MOTIVATION LIMITS ACCURACY

## 2024-10-29 NOTE — PROGRESS NOTES
Subjective  Leshone K Jones, 34 y.o. male presents today with:  Chief Complaint   Patient presents with    Follow-up     General follow up on weight        HPI  In the office today with his mom.  Last OV with me:9/22/23  Due for routine labs.    Binge eating disorder--gained 20 lbs over the past year.  Would like referral to [facility] or  bariatric surgery program.    Unsure if he wants true bypass, would prefer gastric sleeve given history of ASD.   Open to starting medication.    IGNACIO-using cpap machine faithfully. Doing well with machine.  Sleep is restful.     Low testosterone-tired, fatigued.  +weight gain.      Review of Systems   Constitutional:  Positive for activity change, fatigue and unexpected weight change. Negative for appetite change, chills, diaphoresis and fever.   HENT:  Negative for congestion, facial swelling, mouth sores and trouble swallowing.    Eyes:  Negative for pain, discharge, redness, itching and visual disturbance.   Respiratory:  Positive for shortness of breath (with exertion). Negative for cough and chest tightness.    Cardiovascular:  Negative for chest pain, palpitations and leg swelling.   Gastrointestinal:  Negative for abdominal pain, constipation, diarrhea, nausea and vomiting.   Endocrine: Negative for polydipsia, polyphagia and polyuria.   Genitourinary:  Negative for difficulty urinating.   Musculoskeletal:  Positive for arthralgias (L knee pain), gait problem and joint swelling. Negative for back pain and myalgias.   Skin:  Negative for pallor, rash and wound.   Neurological:  Negative for dizziness, tremors, facial asymmetry, light-headedness and headaches.   Psychiatric/Behavioral:  Negative for agitation, behavioral problems, confusion, dysphoric mood, hallucinations and sleep disturbance. The patient is not nervous/anxious.        Past Medical History:   Diagnosis Date    Autism spectrum disorder     Hypogonadism male     Obesity      No past surgical history on

## 2025-05-12 ENCOUNTER — TELEPHONE (OUTPATIENT)
Age: 35
End: 2025-05-12

## 2025-05-12 DIAGNOSIS — R73.03 PREDIABETES: ICD-10-CM

## 2025-05-12 DIAGNOSIS — E53.8 FOLATE DEFICIENCY: ICD-10-CM

## 2025-05-12 DIAGNOSIS — E55.9 VITAMIN D DEFICIENCY: ICD-10-CM

## 2025-05-12 DIAGNOSIS — E88.819 INSULIN RESISTANCE: ICD-10-CM

## 2025-05-12 DIAGNOSIS — E29.1 HYPOGONADISM MALE: ICD-10-CM

## 2025-05-12 DIAGNOSIS — E66.01 MORBID OBESITY (HCC): Primary | ICD-10-CM

## 2025-05-12 NOTE — TELEPHONE ENCOUNTER
Pt mother called about the referral for bariatrics for the [facility] from some years back. The place they had been going to and using shut down and now she is not sure what to do. Her son is over 400lbs and autistic. She stated that ladonna is aware of the situation but she is not sure what to do. Can Ladonna put in another referral? Please advise.

## 2025-05-14 NOTE — TELEPHONE ENCOUNTER
Dr. Altman referred them out to someone in [place] in the past . Pt's mother does not want to go back to Dr. Altman she does not like him. Pt's mother was hoping to get pt on a GLP-1 and also have blood work ordered for patient. I did advise pt's mother that pt would need an appointment to come in and discuss medication and blood work. Pt's mother Anuja states she has an appointment on 06/20/25 and is wanting patient to come in the same day. I explained that ladonna doesn't have any availability that day but pt's mother states if we just ask provider since she knows what is going on with pt that she shouldn't have a problem with an appointment on the same day as mothers appointment on 06/20/25.    Please advise   Call back # [phone]

## 2025-06-20 ENCOUNTER — OFFICE VISIT (OUTPATIENT)
Age: 35
End: 2025-06-20

## 2025-06-20 VITALS
DIASTOLIC BLOOD PRESSURE: 78 MMHG | HEIGHT: 67 IN | HEART RATE: 82 BPM | BODY MASS INDEX: 49.44 KG/M2 | WEIGHT: 315 LBS | OXYGEN SATURATION: 94 % | SYSTOLIC BLOOD PRESSURE: 130 MMHG | RESPIRATION RATE: 16 BRPM

## 2025-06-20 DIAGNOSIS — R73.03 PREDIABETES: ICD-10-CM

## 2025-06-20 DIAGNOSIS — R03.0 ELEVATED BLOOD PRESSURE READING: ICD-10-CM

## 2025-06-20 DIAGNOSIS — E55.9 VITAMIN D DEFICIENCY: ICD-10-CM

## 2025-06-20 DIAGNOSIS — M25.562 CHRONIC PAIN OF BOTH KNEES: ICD-10-CM

## 2025-06-20 DIAGNOSIS — I89.0 LYMPHEDEMA: ICD-10-CM

## 2025-06-20 DIAGNOSIS — F84.0 AUTISM SPECTRUM DISORDER: ICD-10-CM

## 2025-06-20 DIAGNOSIS — E88.819 INSULIN RESISTANCE: ICD-10-CM

## 2025-06-20 DIAGNOSIS — G47.33 OSA (OBSTRUCTIVE SLEEP APNEA): ICD-10-CM

## 2025-06-20 DIAGNOSIS — E66.01 MORBID OBESITY (HCC): ICD-10-CM

## 2025-06-20 DIAGNOSIS — E29.1 HYPOGONADISM MALE: ICD-10-CM

## 2025-06-20 DIAGNOSIS — G89.29 CHRONIC PAIN OF BOTH KNEES: ICD-10-CM

## 2025-06-20 DIAGNOSIS — R63.5 WEIGHT GAIN: ICD-10-CM

## 2025-06-20 DIAGNOSIS — E53.8 FOLATE DEFICIENCY: ICD-10-CM

## 2025-06-20 DIAGNOSIS — F50.812 SEVERE BINGE-EATING DISORDER: ICD-10-CM

## 2025-06-20 DIAGNOSIS — M25.561 CHRONIC PAIN OF BOTH KNEES: ICD-10-CM

## 2025-06-20 LAB
ALBUMIN SERPL-MCNC: 4.2 G/DL (ref 3.5–4.6)
ALP SERPL-CCNC: 85 U/L (ref 35–104)
ALT SERPL-CCNC: 23 U/L (ref 0–41)
ANION GAP SERPL CALCULATED.3IONS-SCNC: 13 MEQ/L (ref 9–15)
AST SERPL-CCNC: 22 U/L (ref 0–40)
BILIRUB SERPL-MCNC: 0.4 MG/DL (ref 0.2–0.7)
BUN SERPL-MCNC: 13 MG/DL (ref 6–20)
CALCIUM SERPL-MCNC: 9.5 MG/DL (ref 8.5–9.9)
CHLORIDE SERPL-SCNC: 99 MEQ/L (ref 95–107)
CHOLEST SERPL-MCNC: 158 MG/DL (ref 0–199)
CO2 SERPL-SCNC: 25 MEQ/L (ref 20–31)
CREAT SERPL-MCNC: 1.03 MG/DL (ref 0.7–1.2)
ERYTHROCYTE [DISTWIDTH] IN BLOOD BY AUTOMATED COUNT: 14.6 % (ref 11.5–14.5)
FOLATE: 7.5 NG/ML (ref 4.8–24.2)
GLOBULIN SER CALC-MCNC: 4 G/DL (ref 2.3–3.5)
GLUCOSE SERPL-MCNC: 87 MG/DL (ref 70–99)
HCT VFR BLD AUTO: 43.3 % (ref 42–52)
HDLC SERPL-MCNC: 45 MG/DL (ref 40–59)
HGB BLD-MCNC: 13.5 G/DL (ref 14–18)
LDL CHOLESTEROL: 101 MG/DL (ref 0–129)
MCH RBC QN AUTO: 25.6 PG (ref 27–31.3)
MCHC RBC AUTO-ENTMCNC: 31.2 % (ref 33–37)
MCV RBC AUTO: 82 FL (ref 79–92.2)
PLATELET # BLD AUTO: 341 K/UL (ref 130–400)
POTASSIUM SERPL-SCNC: 4.4 MEQ/L (ref 3.4–4.9)
PROT SERPL-MCNC: 8.2 G/DL (ref 6.3–8)
RBC # BLD AUTO: 5.28 M/UL (ref 4.7–6.1)
SODIUM SERPL-SCNC: 137 MEQ/L (ref 135–144)
T4 FREE SERPL-MCNC: 1.02 NG/DL (ref 0.84–1.68)
TRIGLYCERIDE, FASTING: 61 MG/DL (ref 0–150)
TSH SERPL-MCNC: 2.38 UIU/ML (ref 0.44–3.86)
VITAMIN B-12: 940 PG/ML (ref 232–1245)
WBC # BLD AUTO: 5.4 K/UL (ref 4.8–10.8)

## 2025-06-20 SDOH — ECONOMIC STABILITY: FOOD INSECURITY: WITHIN THE PAST 12 MONTHS, THE FOOD YOU BOUGHT JUST DIDN'T LAST AND YOU DIDN'T HAVE MONEY TO GET MORE.: NEVER TRUE

## 2025-06-20 SDOH — ECONOMIC STABILITY: FOOD INSECURITY: WITHIN THE PAST 12 MONTHS, YOU WORRIED THAT YOUR FOOD WOULD RUN OUT BEFORE YOU GOT MONEY TO BUY MORE.: NEVER TRUE

## 2025-06-20 ASSESSMENT — PATIENT HEALTH QUESTIONNAIRE - PHQ9: DEPRESSION UNABLE TO ASSESS: FUNCTIONAL CAPACITY MOTIVATION LIMITS ACCURACY

## 2025-06-20 ASSESSMENT — ENCOUNTER SYMPTOMS
DIARRHEA: 0
EYE REDNESS: 0
CHEST TIGHTNESS: 0
NAUSEA: 0
CONSTIPATION: 0
SHORTNESS OF BREATH: 1
TROUBLE SWALLOWING: 0
ABDOMINAL PAIN: 0
VOMITING: 0
FACIAL SWELLING: 0
EYE ITCHING: 0
COUGH: 0
EYE PAIN: 0
BACK PAIN: 0
EYE DISCHARGE: 0

## 2025-06-20 NOTE — PROGRESS NOTES
Subjective  Leshone K Jones, 35 y.o. male presents today with:  Chief Complaint   Patient presents with    Weight Loss       HPI  In the office today with his mom.  Last OV with me:10/29/24  In the office with his mom.   Due for routine labs.    Binge eating disorder--continued to gain weight. Over 50 lbs in less than a year.  Would like referral to [facility] or  bariatric surgery program but it was closed.     Mother would like a new referral again.   Unsure if he wants true bypass, would prefer gastric sleeve given history of ASD.   Open to starting medication.    Chronic joint pain, having worsening knee pain.   Cannot walk long distances.     IGNACIO-using cpap machine faithfully. Doing well with machine.  Sleep is restful.     Low testosterone-tired, fatigued.  +weight gain.      Review of Systems   Constitutional:  Positive for activity change, fatigue, unexpected weight change and weight loss. Negative for appetite change, chills, diaphoresis and fever.   HENT:  Negative for congestion, facial swelling, mouth sores and trouble swallowing.    Eyes:  Negative for pain, discharge, redness, itching and visual disturbance.   Respiratory:  Positive for shortness of breath (with exertion). Negative for cough and chest tightness.    Cardiovascular:  Negative for chest pain, palpitations and leg swelling.   Gastrointestinal:  Negative for abdominal pain, constipation, diarrhea, nausea and vomiting.   Endocrine: Negative for polydipsia, polyphagia and polyuria.   Genitourinary:  Negative for difficulty urinating.   Musculoskeletal:  Positive for arthralgias (L knee pain), gait problem and joint swelling. Negative for back pain and myalgias.   Skin:  Negative for pallor, rash and wound.   Neurological:  Negative for dizziness, tremors, facial asymmetry, light-headedness and headaches.   Psychiatric/Behavioral:  Negative for agitation, behavioral problems, confusion, dysphoric mood, hallucinations and sleep disturbance. The

## 2025-06-21 ENCOUNTER — RESULTS FOLLOW-UP (OUTPATIENT)
Age: 35
End: 2025-06-21

## 2025-06-21 DIAGNOSIS — R79.89 LOW TESTOSTERONE: Primary | ICD-10-CM

## 2025-06-21 LAB
ESTIMATED AVERAGE GLUCOSE: 117 MG/DL
HBA1C MFR BLD: 5.7 % (ref 4–6)
SHBG SERPL-SCNC: 23 NMOL/L (ref 17–56)
TESTOST FREE SERPL-MCNC: 29.6 PG/ML (ref 47–244)
TESTOST SERPL-MCNC: 131 NG/DL (ref 249–836)
VITAMIN D 25-HYDROXY: 24.5 NG/ML (ref 30–100)

## 2025-06-23 LAB — THYROPEROXIDASE IGG SERPL-ACNC: 5.3 IU/ML (ref 0–25)

## 2025-06-24 LAB
INSULIN FREE SERPL-ACNC: 22 UIU/ML (ref 3–25)
INSULIN SERPL-ACNC: 31 UIU/ML (ref 3–25)

## 2025-06-25 ENCOUNTER — TELEPHONE (OUTPATIENT)
Age: 35
End: 2025-06-25

## 2025-06-25 RX ORDER — NAPROXEN 500 MG/1
500 TABLET ORAL 2 TIMES DAILY WITH MEALS
Qty: 60 TABLET | Refills: 3 | Status: SHIPPED | OUTPATIENT
Start: 2025-06-25

## 2025-06-25 RX ORDER — IRON HEME POLYPEPTIDE/FOLIC AC 12-1MG
TABLET ORAL
Qty: 55 CAPSULE | Refills: 1 | Status: SHIPPED | OUTPATIENT
Start: 2025-06-25 | End: 2025-10-07

## 2025-06-25 NOTE — TELEPHONE ENCOUNTER
Pt's mother is requesting naproxen to be sent to the pharmacy for the pt for leg and back pain. Pt's mother states her and pt both suffer from leg and back pain and would like a prescription to help. Mother states this was discussed at previous visit.    Please advise

## 2025-07-18 RX ORDER — IRON HEME POLYPEPTIDE/FOLIC AC 12-1MG
TABLET ORAL
Qty: 165 CAPSULE | Refills: 1 | Status: SHIPPED | OUTPATIENT
Start: 2025-07-18 | End: 2025-10-30

## 2025-07-18 NOTE — TELEPHONE ENCOUNTER
Comments:     Last Office Visit (last PCP visit):   6/20/2025    Next Visit Date:  Future Appointments   Date Time Provider Department Center   7/21/2025  1:45 PM Irasema Brock PA-C [facility] DEP   8/20/2025  1:00 PM Thompson Altman MD [facility]         Rx requested:  Requested Prescriptions     Pending Prescriptions Disp Refills    vitamin D (DIALYVITE VITAMIN D 5000) 125 MCG (5000 UT) CAPS capsule [Pharmacy Med Name: DIALYVITE VITAMIN D 5,000 UNIT] 165 capsule 1     Sig: Take 2 capsules by mouth daily for 14 days, THEN 1 capsule daily.